# Patient Record
Sex: FEMALE | Race: WHITE | NOT HISPANIC OR LATINO | Employment: FULL TIME | URBAN - METROPOLITAN AREA
[De-identification: names, ages, dates, MRNs, and addresses within clinical notes are randomized per-mention and may not be internally consistent; named-entity substitution may affect disease eponyms.]

---

## 2023-10-24 ENCOUNTER — APPOINTMENT (OUTPATIENT)
Dept: SURGERY | Facility: CLINIC | Age: 48
End: 2023-10-24
Payer: COMMERCIAL

## 2023-11-09 DIAGNOSIS — M25.561 RIGHT KNEE PAIN, UNSPECIFIED CHRONICITY: Primary | ICD-10-CM

## 2023-11-15 ENCOUNTER — OFFICE VISIT (OUTPATIENT)
Dept: ORTHOPEDIC SURGERY | Facility: CLINIC | Age: 48
End: 2023-11-15
Payer: COMMERCIAL

## 2023-11-15 ENCOUNTER — HOSPITAL ENCOUNTER (OUTPATIENT)
Dept: RADIOLOGY | Facility: HOSPITAL | Age: 48
Discharge: HOME | End: 2023-11-15
Payer: COMMERCIAL

## 2023-11-15 VITALS — WEIGHT: 293 LBS | HEIGHT: 66 IN | BODY MASS INDEX: 47.09 KG/M2

## 2023-11-15 DIAGNOSIS — M54.31 RIGHT SCIATIC NERVE PAIN: ICD-10-CM

## 2023-11-15 DIAGNOSIS — M25.561 RIGHT KNEE PAIN, UNSPECIFIED CHRONICITY: ICD-10-CM

## 2023-11-15 PROCEDURE — 1036F TOBACCO NON-USER: CPT

## 2023-11-15 PROCEDURE — 20610 DRAIN/INJ JOINT/BURSA W/O US: CPT

## 2023-11-15 PROCEDURE — 73564 X-RAY EXAM KNEE 4 OR MORE: CPT | Mod: RIGHT SIDE | Performed by: RADIOLOGY

## 2023-11-15 PROCEDURE — 73564 X-RAY EXAM KNEE 4 OR MORE: CPT | Mod: RT,FY

## 2023-11-15 PROCEDURE — 99203 OFFICE O/P NEW LOW 30 MIN: CPT

## 2023-11-15 PROCEDURE — 3008F BODY MASS INDEX DOCD: CPT

## 2023-11-15 RX ORDER — TRIAMCINOLONE ACETONIDE 40 MG/ML
10 INJECTION, SUSPENSION INTRA-ARTICULAR; INTRAMUSCULAR
Status: COMPLETED | OUTPATIENT
Start: 2023-11-15 | End: 2023-11-15

## 2023-11-15 RX ORDER — HYDROCHLOROTHIAZIDE 25 MG/1
25 TABLET ORAL DAILY
COMMUNITY
End: 2024-05-02 | Stop reason: WASHOUT

## 2023-11-15 RX ORDER — LIDOCAINE HYDROCHLORIDE 5 MG/ML
4 INJECTION, SOLUTION INFILTRATION; PERINEURAL
Status: COMPLETED | OUTPATIENT
Start: 2023-11-15 | End: 2023-11-15

## 2023-11-15 RX ADMIN — TRIAMCINOLONE ACETONIDE 10 MG: 40 INJECTION, SUSPENSION INTRA-ARTICULAR; INTRAMUSCULAR at 13:07

## 2023-11-15 RX ADMIN — LIDOCAINE HYDROCHLORIDE 4 ML: 5 INJECTION, SOLUTION INFILTRATION; PERINEURAL at 13:07

## 2023-11-15 ASSESSMENT — PAIN - FUNCTIONAL ASSESSMENT: PAIN_FUNCTIONAL_ASSESSMENT: 0-10

## 2023-11-15 ASSESSMENT — PAIN SCALES - GENERAL: PAINLEVEL_OUTOF10: 5 - MODERATE PAIN

## 2023-11-15 ASSESSMENT — PAIN DESCRIPTION - DESCRIPTORS: DESCRIPTORS: ACHING

## 2023-11-15 NOTE — PROGRESS NOTES
HPI  Lore Olguin is a 48 y.o. female  in office today for   Chief Complaint   Patient presents with    Right Knee - Edema, Pain   .  she also has pain in the left knee, but right is considerably worse.  Pain started about 7 months ago, he went to the ED at the time, x-rays negative, was diagnosed with bursitis by another provider at the time, went to a few sessions of PT which helped.  Over the last 2 months the pain has increased again and over the past 2 weeks to the point where she is having difficulty with stairs.  She has been taking ibuprofen and tylenol, resting, icing and elevating the knee with no good relief of the pain.  She also mentions she has right side sciatic pain and right shoulder pain.      Medication  Current Outpatient Medications on File Prior to Visit   Medication Sig Dispense Refill    hydroCHLOROthiazide (HYDRODiuril) 25 mg tablet Take 1 tablet (25 mg) by mouth once daily.       No current facility-administered medications on file prior to visit.       Physical Exam  Constitutional: well developed, well nourished overweight female in no acute distress  Psychiatric: normal mood, appropriate affect  Eyes: sclera anicteric  HENT: normocephalic/atraumatic  CV: regular rate and rhythm   Respiratory: non labored breathing  Integumentary: no rash  Neurological: moves all extremities    Right Knee Exam     Tenderness   The patient is experiencing tenderness in the medial joint line and MCL.    Range of Motion   Extension:  0   Flexion:  120     Tests   Margarito:  Medial - positive Lateral - negative  Varus: negative Valgus: negative  Drawer:  Anterior - negative    Posterior - negative  Patellar apprehension: negative        Patient ID: Lore Olguin is a 48 y.o. female.    L Inj/Asp: R knee on 11/15/2023 1:07 PM  Indications: pain  Details: 22 G needle, anterolateral approach  Medications: 10 mg triamcinolone acetonide 40 mg/mL; 4 mL lidocaine 5 mg/mL (0.5 %)  Outcome: tolerated well, no immediate  complications  Procedure, treatment alternatives, risks and benefits explained, specific risks discussed. Consent was given by the patient. Immediately prior to procedure a time out was called to verify the correct patient, procedure, equipment, support staff and site/side marked as required. Patient was prepped and draped in the usual sterile fashion.             Imaging/Lab:  X-rays were taken today which were reviewed by myself and read by myself and show no acute fracture or dislocation, no significant degenerative change      Assessment  Assessment: Right knee pain    Plan  Plan:  History, physical exam, and imaging were reviewed with patient. Given persistence of pain and positive Margarito, meniscus injury can not be ruled out.  Discussed conservative options for knee pain and meniscus pain including RICE, antiinflammatories, injections, and PT.  She will continue with RICE and antiinflammatories as she has been, additionally orders for PT were given to patient as she does not recall the exercises from PT earlier in the year.  Additionally trying a cortisone injection today per the procedure note above.  Also entered referral for ortho spine for sciatic and right sided arm pain she has been having.  Follow Up: Patient to follow up as needed if pain persists or gets worse over the next 4-6 weeks.      All questions were answered for the patient prior to end of exam and patient addressed their understanding.    Lianne Zeng PA-C  11/15/23

## 2023-11-22 DIAGNOSIS — K21.9 GASTROESOPHAGEAL REFLUX DISEASE WITHOUT ESOPHAGITIS: Primary | ICD-10-CM

## 2023-11-22 DIAGNOSIS — K58.8 OTHER IRRITABLE BOWEL SYNDROME: ICD-10-CM

## 2023-12-02 RX ORDER — DICYCLOMINE HYDROCHLORIDE 20 MG/1
20 TABLET ORAL 3 TIMES DAILY
Qty: 270 TABLET | Refills: 3 | Status: SHIPPED | OUTPATIENT
Start: 2023-12-02 | End: 2024-05-16 | Stop reason: SINTOL

## 2023-12-02 RX ORDER — OMEPRAZOLE 40 MG/1
CAPSULE, DELAYED RELEASE ORAL
Qty: 90 CAPSULE | Refills: 3 | Status: SHIPPED | OUTPATIENT
Start: 2023-12-02

## 2024-05-02 ENCOUNTER — OFFICE VISIT (OUTPATIENT)
Dept: PRIMARY CARE | Facility: CLINIC | Age: 49
End: 2024-05-02
Payer: COMMERCIAL

## 2024-05-02 VITALS
HEIGHT: 66 IN | OXYGEN SATURATION: 97 % | SYSTOLIC BLOOD PRESSURE: 140 MMHG | RESPIRATION RATE: 16 BRPM | WEIGHT: 293 LBS | BODY MASS INDEX: 47.09 KG/M2 | DIASTOLIC BLOOD PRESSURE: 92 MMHG | HEART RATE: 87 BPM

## 2024-05-02 DIAGNOSIS — Z12.31 SCREENING MAMMOGRAM FOR BREAST CANCER: ICD-10-CM

## 2024-05-02 DIAGNOSIS — E66.01 MORBID OBESITY DUE TO EXCESS CALORIES (MULTI): ICD-10-CM

## 2024-05-02 DIAGNOSIS — Z13.6 SCREENING FOR CARDIOVASCULAR CONDITION: ICD-10-CM

## 2024-05-02 DIAGNOSIS — M19.071 PRIMARY OSTEOARTHRITIS OF RIGHT FOOT: ICD-10-CM

## 2024-05-02 DIAGNOSIS — K21.9 GERD WITHOUT ESOPHAGITIS: ICD-10-CM

## 2024-05-02 DIAGNOSIS — I10 PRIMARY HYPERTENSION: ICD-10-CM

## 2024-05-02 DIAGNOSIS — Z13.29 SCREENING FOR HYPOTHYROIDISM: ICD-10-CM

## 2024-05-02 DIAGNOSIS — G47.33 OBSTRUCTIVE SLEEP APNEA: ICD-10-CM

## 2024-05-02 DIAGNOSIS — R60.0 EDEMA OF BOTH LEGS: ICD-10-CM

## 2024-05-02 DIAGNOSIS — R73.9 HYPERGLYCEMIA: ICD-10-CM

## 2024-05-02 DIAGNOSIS — E11.40 TYPE 2 DIABETES MELLITUS WITH DIABETIC NEUROPATHY, WITHOUT LONG-TERM CURRENT USE OF INSULIN (MULTI): ICD-10-CM

## 2024-05-02 DIAGNOSIS — E11.9 DIABETES MELLITUS WITHOUT COMPLICATION (MULTI): Primary | ICD-10-CM

## 2024-05-02 PROCEDURE — 3008F BODY MASS INDEX DOCD: CPT | Performed by: INTERNAL MEDICINE

## 2024-05-02 PROCEDURE — 4010F ACE/ARB THERAPY RXD/TAKEN: CPT | Performed by: INTERNAL MEDICINE

## 2024-05-02 PROCEDURE — 3077F SYST BP >= 140 MM HG: CPT | Performed by: INTERNAL MEDICINE

## 2024-05-02 PROCEDURE — 1036F TOBACCO NON-USER: CPT | Performed by: INTERNAL MEDICINE

## 2024-05-02 PROCEDURE — 3080F DIAST BP >= 90 MM HG: CPT | Performed by: INTERNAL MEDICINE

## 2024-05-02 PROCEDURE — 99214 OFFICE O/P EST MOD 30 MIN: CPT | Performed by: INTERNAL MEDICINE

## 2024-05-02 RX ORDER — TIRZEPATIDE 2.5 MG/.5ML
2.5 INJECTION, SOLUTION SUBCUTANEOUS
Qty: 6 ML | Refills: 3 | Status: SHIPPED | OUTPATIENT
Start: 2024-05-05 | End: 2025-05-05

## 2024-05-02 RX ORDER — LORATADINE 10 MG/1
10 TABLET ORAL DAILY PRN
COMMUNITY

## 2024-05-02 RX ORDER — LEVONORGESTREL 52 MG/1
1 INTRAUTERINE DEVICE INTRAUTERINE ONCE
COMMUNITY

## 2024-05-02 RX ORDER — LOSARTAN POTASSIUM 25 MG/1
25 TABLET ORAL DAILY
Qty: 100 TABLET | Refills: 3 | Status: SHIPPED | OUTPATIENT
Start: 2024-05-02 | End: 2025-06-06

## 2024-05-02 RX ORDER — BUPROPION HYDROCHLORIDE 150 MG/1
150 TABLET ORAL EVERY MORNING
Qty: 90 TABLET | Refills: 3 | Status: SHIPPED | OUTPATIENT
Start: 2024-05-02 | End: 2025-05-02

## 2024-05-02 ASSESSMENT — ENCOUNTER SYMPTOMS
OCCASIONAL FEELINGS OF UNSTEADINESS: 0
SHORTNESS OF BREATH: 1
ABDOMINAL PAIN: 1
ARTHRALGIAS: 1
LOSS OF SENSATION IN FEET: 0
NAUSEA: 0
DEPRESSION: 0
PALPITATIONS: 0
COUGH: 0
DIZZINESS: 0
DIARRHEA: 0
CONSTIPATION: 0

## 2024-05-02 ASSESSMENT — PATIENT HEALTH QUESTIONNAIRE - PHQ9
1. LITTLE INTEREST OR PLEASURE IN DOING THINGS: NOT AT ALL
SUM OF ALL RESPONSES TO PHQ9 QUESTIONS 1 AND 2: 0
2. FEELING DOWN, DEPRESSED OR HOPELESS: NOT AT ALL

## 2024-05-02 ASSESSMENT — PAIN SCALES - GENERAL: PAINLEVEL: 5

## 2024-05-02 NOTE — PROGRESS NOTES
"Subjective   Patient ID: Lore Olguin is a 48 y.o. female who presents for GI issues. Patient reports abdom pressure, despite taking meds. Edema is worse, c/o R foot pain, the foot doctor prescribed meloxicam which made her retain fluid and  gain weight. C/o exertional SOB, epigastric pain, bloating    Diagnostics Reviewed:  Labs Reviewed:         Review of Systems   Respiratory:  Positive for shortness of breath. Negative for cough.    Cardiovascular:  Negative for chest pain and palpitations.   Gastrointestinal:  Positive for abdominal pain. Negative for constipation, diarrhea and nausea.   Musculoskeletal:  Positive for arthralgias.   Neurological:  Negative for dizziness.       Objective   BP (!) 140/92   Pulse 87   Resp 16   Ht 1.676 m (5' 6\")   Wt (!) 157 kg (347 lb)   LMP  (LMP Unknown) Comment: absent with mirena  SpO2 97%   BMI 56.01 kg/m²     Physical Exam  Constitutional:       Appearance: Normal appearance.   Cardiovascular:      Rate and Rhythm: Normal rate and regular rhythm.      Heart sounds: Normal heart sounds.   Pulmonary:      Breath sounds: Normal breath sounds.   Abdominal:      Palpations: Abdomen is soft. There is no hepatomegaly.      Tenderness: There is abdominal tenderness (epigastric).   Musculoskeletal:      Right lower leg: Edema present.      Left lower leg: Edema present.   Neurological:      Mental Status: She is alert and oriented to person, place, and time.      Gait: Gait normal.   Psychiatric:         Mood and Affect: Mood normal.         Behavior: Behavior normal.         Assessment/Plan   Diagnoses and all orders for this visit:  Diabetes mellitus without complication (Multi)  -     CBC and Auto Differential; Future  -     Comprehensive Metabolic Panel; Future  -     ELLIOTT without Reflex ADRIANA; Future  -     Hemoglobin A1C; Future  -     Sedimentation Rate; Future  -     C-Reactive Protein; Future  -     Lipid Panel; Future  -     TSH with reflex to Free T4 if abnormal; " Future  -     Albumin , Urine Random; Future  -     Uric Acid; Future  -     Citrulline Antibody, IgG; Future  -     H. Pylori Antigen, Stool; Future  -     Referral to Gastroenterology; Future  -     Cortisol, Urine, Free; Future  -     Creatinine, urine, 24 hour; Future  Primary hypertension  -     CBC and Auto Differential; Future  -     Comprehensive Metabolic Panel; Future  -     ELLIOTT without Reflex ADRIANA; Future  -     Hemoglobin A1C; Future  -     Sedimentation Rate; Future  -     C-Reactive Protein; Future  -     Lipid Panel; Future  -     TSH with reflex to Free T4 if abnormal; Future  -     Albumin , Urine Random; Future  -     Uric Acid; Future  -     Citrulline Antibody, IgG; Future  -     H. Pylori Antigen, Stool; Future  -     Referral to Gastroenterology; Future  -     Cortisol, Urine, Free; Future  -     Creatinine, urine, 24 hour; Future  -     losartan (Cozaar) 25 mg tablet; Take 1 tablet (25 mg) by mouth once daily.  Hyperglycemia  -     CBC and Auto Differential; Future  -     Comprehensive Metabolic Panel; Future  -     ELLIOTT without Reflex ADRIANA; Future  -     Hemoglobin A1C; Future  -     Sedimentation Rate; Future  -     C-Reactive Protein; Future  -     Lipid Panel; Future  -     TSH with reflex to Free T4 if abnormal; Future  -     Albumin , Urine Random; Future  -     Uric Acid; Future  -     Citrulline Antibody, IgG; Future  -     H. Pylori Antigen, Stool; Future  -     Referral to Gastroenterology; Future  -     Cortisol, Urine, Free; Future  -     Creatinine, urine, 24 hour; Future  GERD without esophagitis  -     CBC and Auto Differential; Future  -     Comprehensive Metabolic Panel; Future  -     ELLIOTT without Reflex ADRIANA; Future  -     Hemoglobin A1C; Future  -     Sedimentation Rate; Future  -     C-Reactive Protein; Future  -     Lipid Panel; Future  -     TSH with reflex to Free T4 if abnormal; Future  -     Albumin , Urine Random; Future  -     Uric Acid; Future  -     Citrulline Antibody,  IgG; Future  -     H. Pylori Antigen, Stool; Future  -     Referral to Gastroenterology; Future  -     Cortisol, Urine, Free; Future  -     Creatinine, urine, 24 hour; Future  -     Referral to Gastroenterology; Future  Screening mammogram for breast cancer  -     CBC and Auto Differential; Future  -     Comprehensive Metabolic Panel; Future  -     ELLIOTT without Reflex ADRIANA; Future  -     Hemoglobin A1C; Future  -     Sedimentation Rate; Future  -     C-Reactive Protein; Future  -     Lipid Panel; Future  -     TSH with reflex to Free T4 if abnormal; Future  -     Albumin , Urine Random; Future  -     Uric Acid; Future  -     Citrulline Antibody, IgG; Future  -     H. Pylori Antigen, Stool; Future  -     Referral to Gastroenterology; Future  -     Cortisol, Urine, Free; Future  -     Creatinine, urine, 24 hour; Future  -     BI mammo bilateral screening tomosynthesis  -     Referral to Obstetrics / Gynecology; Future  Screening for hypothyroidism  -     CBC and Auto Differential; Future  -     Comprehensive Metabolic Panel; Future  -     ELLIOTT without Reflex ADRIANA; Future  -     Hemoglobin A1C; Future  -     Sedimentation Rate; Future  -     C-Reactive Protein; Future  -     Lipid Panel; Future  -     TSH with reflex to Free T4 if abnormal; Future  -     Albumin , Urine Random; Future  -     Uric Acid; Future  -     Citrulline Antibody, IgG; Future  -     H. Pylori Antigen, Stool; Future  -     Referral to Gastroenterology; Future  -     Cortisol, Urine, Free; Future  -     Creatinine, urine, 24 hour; Future  Screening for cardiovascular condition  -     CBC and Auto Differential; Future  -     Comprehensive Metabolic Panel; Future  -     ELLIOTT without Reflex ADRIANA; Future  -     Hemoglobin A1C; Future  -     Sedimentation Rate; Future  -     C-Reactive Protein; Future  -     Lipid Panel; Future  -     TSH with reflex to Free T4 if abnormal; Future  -     Albumin , Urine Random; Future  -     Uric Acid; Future  -      Citrulline Antibody, IgG; Future  -     H. Pylori Antigen, Stool; Future  -     Referral to Gastroenterology; Future  -     Cortisol, Urine, Free; Future  -     Creatinine, urine, 24 hour; Future  Primary osteoarthritis of right foot  -     CBC and Auto Differential; Future  -     Comprehensive Metabolic Panel; Future  -     ELLIOTT without Reflex ADRIANA; Future  -     Hemoglobin A1C; Future  -     Sedimentation Rate; Future  -     C-Reactive Protein; Future  -     Lipid Panel; Future  -     TSH with reflex to Free T4 if abnormal; Future  -     Albumin , Urine Random; Future  -     Uric Acid; Future  -     Citrulline Antibody, IgG; Future  -     H. Pylori Antigen, Stool; Future  -     Referral to Gastroenterology; Future  -     Cortisol, Urine, Free; Future  -     Creatinine, urine, 24 hour; Future  Type 2 diabetes mellitus with diabetic neuropathy, without long-term current use of insulin (Multi)  -     CBC and Auto Differential; Future  -     Comprehensive Metabolic Panel; Future  -     ELLIOTT without Reflex ADRIANA; Future  -     Hemoglobin A1C; Future  -     Sedimentation Rate; Future  -     C-Reactive Protein; Future  -     Lipid Panel; Future  -     TSH with reflex to Free T4 if abnormal; Future  -     Albumin , Urine Random; Future  -     Uric Acid; Future  -     Citrulline Antibody, IgG; Future  -     H. Pylori Antigen, Stool; Future  -     Referral to Gastroenterology; Future  -     Cortisol, Urine, Free; Future  -     Creatinine, urine, 24 hour; Future  -     tirzepatide (Mounjaro) 2.5 mg/0.5 mL pen injector; Inject 2.5 mg under the skin 1 (one) time per week.  -     empagliflozin (Jardiance) 10 mg; Take 1 tablet (10 mg) by mouth once daily.  Obstructive sleep apnea  -     Home sleep apnea test (HSAT); Future  Edema of both legs  -     Transthoracic Echo (TTE) Complete; Future  Morbid obesity due to excess calories (Multi)  -     buPROPion XL (Wellbutrin XL) 150 mg 24 hr tablet; Take 1 tablet (150 mg) by mouth once  daily in the morning. Do not crush, chew, or split.  Other orders  -     perflutren lipid microspheres (Definity) injection 0.5-10 mL of dilution  -     sulfur hexafluoride microsphr (Lumason) injection 24.28 mg  -     perflutren protein A microsphere (Optison) injection 0.5 mL

## 2024-05-06 ENCOUNTER — LAB (OUTPATIENT)
Dept: LAB | Facility: LAB | Age: 49
End: 2024-05-06
Payer: COMMERCIAL

## 2024-05-06 DIAGNOSIS — Z13.29 SCREENING FOR HYPOTHYROIDISM: ICD-10-CM

## 2024-05-06 DIAGNOSIS — Z13.6 SCREENING FOR CARDIOVASCULAR CONDITION: ICD-10-CM

## 2024-05-06 DIAGNOSIS — M19.071 PRIMARY OSTEOARTHRITIS OF RIGHT FOOT: ICD-10-CM

## 2024-05-06 DIAGNOSIS — Z12.31 SCREENING MAMMOGRAM FOR BREAST CANCER: ICD-10-CM

## 2024-05-06 DIAGNOSIS — R73.9 HYPERGLYCEMIA: ICD-10-CM

## 2024-05-06 DIAGNOSIS — R76.8 POSITIVE ANA (ANTINUCLEAR ANTIBODY): Primary | ICD-10-CM

## 2024-05-06 DIAGNOSIS — E11.9 DIABETES MELLITUS WITHOUT COMPLICATION (MULTI): ICD-10-CM

## 2024-05-06 DIAGNOSIS — K21.9 GERD WITHOUT ESOPHAGITIS: ICD-10-CM

## 2024-05-06 DIAGNOSIS — E11.40 TYPE 2 DIABETES MELLITUS WITH DIABETIC NEUROPATHY, WITHOUT LONG-TERM CURRENT USE OF INSULIN (MULTI): ICD-10-CM

## 2024-05-06 DIAGNOSIS — I10 PRIMARY HYPERTENSION: ICD-10-CM

## 2024-05-06 LAB
ALBUMIN SERPL-MCNC: 4.2 G/DL (ref 3.5–5)
ALP BLD-CCNC: 110 U/L (ref 35–125)
ALT SERPL-CCNC: 17 U/L (ref 5–40)
ANION GAP SERPL CALC-SCNC: 10 MMOL/L
AST SERPL-CCNC: 11 U/L (ref 5–40)
BASOPHILS # BLD AUTO: 0.08 X10*3/UL (ref 0–0.1)
BASOPHILS NFR BLD AUTO: 0.8 %
BILIRUB SERPL-MCNC: 0.3 MG/DL (ref 0.1–1.2)
BUN SERPL-MCNC: 13 MG/DL (ref 8–25)
CALCIUM SERPL-MCNC: 9.6 MG/DL (ref 8.5–10.4)
CCP IGG SERPL-ACNC: <1 U/ML
CHLORIDE SERPL-SCNC: 102 MMOL/L (ref 97–107)
CHOLEST SERPL-MCNC: 201 MG/DL (ref 133–200)
CHOLEST/HDLC SERPL: 4.5 {RATIO}
CO2 SERPL-SCNC: 27 MMOL/L (ref 24–31)
CREAT SERPL-MCNC: 0.9 MG/DL (ref 0.4–1.6)
CRP SERPL-MCNC: 1.4 MG/DL (ref 0–2)
EGFRCR SERPLBLD CKD-EPI 2021: 79 ML/MIN/1.73M*2
EOSINOPHIL # BLD AUTO: 0.13 X10*3/UL (ref 0–0.7)
EOSINOPHIL NFR BLD AUTO: 1.2 %
ERYTHROCYTE [DISTWIDTH] IN BLOOD BY AUTOMATED COUNT: 14.5 % (ref 11.5–14.5)
ERYTHROCYTE [SEDIMENTATION RATE] IN BLOOD BY WESTERGREN METHOD: 48 MM/H (ref 0–20)
EST. AVERAGE GLUCOSE BLD GHB EST-MCNC: 148 MG/DL
GLUCOSE SERPL-MCNC: 158 MG/DL (ref 65–99)
HBA1C MFR BLD: 6.8 %
HCT VFR BLD AUTO: 41.1 % (ref 36–46)
HDLC SERPL-MCNC: 45 MG/DL
HGB BLD-MCNC: 12.8 G/DL (ref 12–16)
IMM GRANULOCYTES # BLD AUTO: 0.04 X10*3/UL (ref 0–0.7)
IMM GRANULOCYTES NFR BLD AUTO: 0.4 % (ref 0–0.9)
LDLC SERPL CALC-MCNC: 131 MG/DL (ref 65–130)
LYMPHOCYTES # BLD AUTO: 3.43 X10*3/UL (ref 1.2–4.8)
LYMPHOCYTES NFR BLD AUTO: 32.3 %
MCH RBC QN AUTO: 27.4 PG (ref 26–34)
MCHC RBC AUTO-ENTMCNC: 31.1 G/DL (ref 32–36)
MCV RBC AUTO: 88 FL (ref 80–100)
MONOCYTES # BLD AUTO: 0.42 X10*3/UL (ref 0.1–1)
MONOCYTES NFR BLD AUTO: 4 %
NEUTROPHILS # BLD AUTO: 6.51 X10*3/UL (ref 1.2–7.7)
NEUTROPHILS NFR BLD AUTO: 61.3 %
NRBC BLD-RTO: 0 /100 WBCS (ref 0–0)
PLATELET # BLD AUTO: 431 X10*3/UL (ref 150–450)
POTASSIUM SERPL-SCNC: 4.3 MMOL/L (ref 3.4–5.1)
PROT SERPL-MCNC: 7.5 G/DL (ref 5.9–7.9)
RBC # BLD AUTO: 4.68 X10*6/UL (ref 4–5.2)
SODIUM SERPL-SCNC: 139 MMOL/L (ref 133–145)
TRIGL SERPL-MCNC: 125 MG/DL (ref 40–150)
TSH SERPL DL<=0.05 MIU/L-ACNC: 2.57 MIU/L (ref 0.27–4.2)
URATE SERPL-MCNC: 6.8 MG/DL (ref 2.5–6.8)
WBC # BLD AUTO: 10.6 X10*3/UL (ref 4.4–11.3)

## 2024-05-06 PROCEDURE — 86235 NUCLEAR ANTIGEN ANTIBODY: CPT

## 2024-05-06 PROCEDURE — 84443 ASSAY THYROID STIM HORMONE: CPT

## 2024-05-06 PROCEDURE — 85652 RBC SED RATE AUTOMATED: CPT

## 2024-05-06 PROCEDURE — 80061 LIPID PANEL: CPT

## 2024-05-06 PROCEDURE — 84550 ASSAY OF BLOOD/URIC ACID: CPT

## 2024-05-06 PROCEDURE — 86140 C-REACTIVE PROTEIN: CPT

## 2024-05-06 PROCEDURE — 83036 HEMOGLOBIN GLYCOSYLATED A1C: CPT

## 2024-05-06 PROCEDURE — 87449 NOS EACH ORGANISM AG IA: CPT

## 2024-05-06 PROCEDURE — 85025 COMPLETE CBC W/AUTO DIFF WBC: CPT

## 2024-05-06 PROCEDURE — 36415 COLL VENOUS BLD VENIPUNCTURE: CPT

## 2024-05-06 PROCEDURE — 86225 DNA ANTIBODY NATIVE: CPT

## 2024-05-06 PROCEDURE — 86147 CARDIOLIPIN ANTIBODY EA IG: CPT

## 2024-05-06 PROCEDURE — 86038 ANTINUCLEAR ANTIBODIES: CPT

## 2024-05-06 PROCEDURE — 86200 CCP ANTIBODY: CPT

## 2024-05-06 PROCEDURE — 80053 COMPREHEN METABOLIC PANEL: CPT

## 2024-05-07 ENCOUNTER — LAB (OUTPATIENT)
Dept: LAB | Facility: LAB | Age: 49
End: 2024-05-07
Payer: COMMERCIAL

## 2024-05-07 DIAGNOSIS — E11.9 DIABETES MELLITUS WITHOUT COMPLICATION (MULTI): ICD-10-CM

## 2024-05-07 DIAGNOSIS — Z13.29 SCREENING FOR HYPOTHYROIDISM: ICD-10-CM

## 2024-05-07 DIAGNOSIS — M19.071 PRIMARY OSTEOARTHRITIS OF RIGHT FOOT: ICD-10-CM

## 2024-05-07 DIAGNOSIS — E11.40 TYPE 2 DIABETES MELLITUS WITH DIABETIC NEUROPATHY, WITHOUT LONG-TERM CURRENT USE OF INSULIN (MULTI): ICD-10-CM

## 2024-05-07 DIAGNOSIS — K21.9 GERD WITHOUT ESOPHAGITIS: ICD-10-CM

## 2024-05-07 DIAGNOSIS — Z12.31 SCREENING MAMMOGRAM FOR BREAST CANCER: ICD-10-CM

## 2024-05-07 DIAGNOSIS — I10 PRIMARY HYPERTENSION: ICD-10-CM

## 2024-05-07 DIAGNOSIS — R73.9 HYPERGLYCEMIA: ICD-10-CM

## 2024-05-07 DIAGNOSIS — Z13.6 SCREENING FOR CARDIOVASCULAR CONDITION: ICD-10-CM

## 2024-05-07 LAB
COLLECT DURATION TIME SPEC: 24 HRS
CREAT 24H UR-MCNC: 72 MG/DL
CREAT 24H UR-MRATE: 1.58 G/24 H
CREAT UR-MCNC: 72.1 MG/DL
MICROALBUMIN UR-MCNC: <12 MG/L (ref 0–23)
MICROALBUMIN/CREAT UR: NORMAL MG/G{CREAT}
SPECIMEN VOL 24H UR: 2200 ML

## 2024-05-07 PROCEDURE — 82043 UR ALBUMIN QUANTITATIVE: CPT

## 2024-05-07 PROCEDURE — 82530 CORTISOL FREE: CPT

## 2024-05-07 PROCEDURE — 82570 ASSAY OF URINE CREATININE: CPT

## 2024-05-07 PROCEDURE — 81050 URINALYSIS VOLUME MEASURE: CPT

## 2024-05-07 NOTE — RESULT ENCOUNTER NOTE
Labs okay except.  She has diabetes, continue meds as prescribed.  Sedimentation rate slightly high, which is nonspecific, awaiting results of stool for H. pylori and 24-hour urine collection for cortisol level

## 2024-05-09 LAB
ANA PATTERN: ABNORMAL
ANA SER QL HEP2 SUBST: POSITIVE
ANA TITR SER IF: ABNORMAL {TITER}
H PYLORI AG STL QL IA: NEGATIVE

## 2024-05-10 LAB
CARDIOLIPIN IGA SERPL-ACNC: 0.7 APL U/ML
CARDIOLIPIN IGG SER IA-ACNC: 1.9 GPL U/ML
CARDIOLIPIN IGM SER IA-ACNC: 7.5 MPL U/ML
DSDNA AB SER-ACNC: <1 IU/ML
ENA SCL70 AB SER QL IA: <0.2 AI
ENA SM AB SER IA-ACNC: <0.2 AI
ENA SS-A AB SER IA-ACNC: <0.2 AI
ENA SS-B AB SER IA-ACNC: <0.2 AI

## 2024-05-11 ENCOUNTER — CLINICAL SUPPORT (OUTPATIENT)
Dept: SLEEP MEDICINE | Facility: HOSPITAL | Age: 49
End: 2024-05-11
Payer: COMMERCIAL

## 2024-05-11 DIAGNOSIS — G47.33 OBSTRUCTIVE SLEEP APNEA: ICD-10-CM

## 2024-05-11 LAB
ANNOTATION COMMENT IMP: ABNORMAL
COLLECT DURATION TIME SPEC: 24 HR
CORTIS F 24H UR HPLC-MCNC: 5.94 UG/L
CORTIS F 24H UR-MRATE: 13.1 UG/D
CORTIS F/CREAT 24H UR: 8.03 UG/G CRT
CREAT 24H UR-MRATE: 1628 MG/D (ref 700–1600)
CREAT UR-MCNC: 74 MG/DL
SPECIMEN VOL ?TM UR: 2200 ML

## 2024-05-11 PROCEDURE — 95806 SLEEP STUDY UNATT&RESP EFFT: CPT | Performed by: PSYCHIATRY & NEUROLOGY

## 2024-05-13 ENCOUNTER — HOSPITAL ENCOUNTER (OUTPATIENT)
Dept: RADIOLOGY | Facility: CLINIC | Age: 49
Discharge: HOME | End: 2024-05-13
Payer: COMMERCIAL

## 2024-05-13 VITALS — BODY MASS INDEX: 45.99 KG/M2 | HEIGHT: 67 IN | WEIGHT: 293 LBS

## 2024-05-13 PROCEDURE — 77063 BREAST TOMOSYNTHESIS BI: CPT | Performed by: RADIOLOGY

## 2024-05-13 PROCEDURE — 77067 SCR MAMMO BI INCL CAD: CPT

## 2024-05-13 PROCEDURE — 77067 SCR MAMMO BI INCL CAD: CPT | Performed by: RADIOLOGY

## 2024-05-16 ENCOUNTER — OFFICE VISIT (OUTPATIENT)
Dept: PRIMARY CARE | Facility: CLINIC | Age: 49
End: 2024-05-16
Payer: COMMERCIAL

## 2024-05-16 VITALS
SYSTOLIC BLOOD PRESSURE: 130 MMHG | BODY MASS INDEX: 45.99 KG/M2 | HEART RATE: 80 BPM | HEIGHT: 67 IN | OXYGEN SATURATION: 99 % | RESPIRATION RATE: 16 BRPM | WEIGHT: 293 LBS | DIASTOLIC BLOOD PRESSURE: 82 MMHG

## 2024-05-16 DIAGNOSIS — I10 PRIMARY HYPERTENSION: ICD-10-CM

## 2024-05-16 DIAGNOSIS — R11.0 NAUSEA: ICD-10-CM

## 2024-05-16 DIAGNOSIS — K21.9 GERD WITHOUT ESOPHAGITIS: Primary | ICD-10-CM

## 2024-05-16 DIAGNOSIS — E11.9 DIABETES MELLITUS WITHOUT COMPLICATION (MULTI): ICD-10-CM

## 2024-05-16 PROCEDURE — 4010F ACE/ARB THERAPY RXD/TAKEN: CPT | Performed by: INTERNAL MEDICINE

## 2024-05-16 PROCEDURE — 3061F NEG MICROALBUMINURIA REV: CPT | Performed by: INTERNAL MEDICINE

## 2024-05-16 PROCEDURE — 3050F LDL-C >= 130 MG/DL: CPT | Performed by: INTERNAL MEDICINE

## 2024-05-16 PROCEDURE — 3044F HG A1C LEVEL LT 7.0%: CPT | Performed by: INTERNAL MEDICINE

## 2024-05-16 PROCEDURE — 99213 OFFICE O/P EST LOW 20 MIN: CPT | Performed by: INTERNAL MEDICINE

## 2024-05-16 PROCEDURE — 3079F DIAST BP 80-89 MM HG: CPT | Performed by: INTERNAL MEDICINE

## 2024-05-16 PROCEDURE — 3008F BODY MASS INDEX DOCD: CPT | Performed by: INTERNAL MEDICINE

## 2024-05-16 PROCEDURE — 3075F SYST BP GE 130 - 139MM HG: CPT | Performed by: INTERNAL MEDICINE

## 2024-05-16 RX ORDER — ONDANSETRON 4 MG/1
8 TABLET, FILM COATED ORAL EVERY 8 HOURS PRN
Qty: 90 TABLET | Refills: 1 | Status: SHIPPED | OUTPATIENT
Start: 2024-05-16 | End: 2024-06-10 | Stop reason: ALTCHOICE

## 2024-05-16 ASSESSMENT — PATIENT HEALTH QUESTIONNAIRE - PHQ9
SUM OF ALL RESPONSES TO PHQ9 QUESTIONS 1 AND 2: 0
2. FEELING DOWN, DEPRESSED OR HOPELESS: NOT AT ALL
1. LITTLE INTEREST OR PLEASURE IN DOING THINGS: NOT AT ALL

## 2024-05-16 ASSESSMENT — ENCOUNTER SYMPTOMS
ROS GI COMMENTS: ERUCTATION
NAUSEA: 1
CONSTIPATION: 0
DEPRESSION: 0
ABDOMINAL PAIN: 1
LOSS OF SENSATION IN FEET: 0
DIARRHEA: 1
COUGH: 0
PALPITATIONS: 0
ABDOMINAL DISTENTION: 1
OCCASIONAL FEELINGS OF UNSTEADINESS: 0
DIZZINESS: 0
ARTHRALGIAS: 1
SHORTNESS OF BREATH: 1

## 2024-05-16 ASSESSMENT — PAIN SCALES - GENERAL: PAINLEVEL: 0-NO PAIN

## 2024-05-16 NOTE — PROGRESS NOTES
"Subjective   Patient ID: Lore Olguin is a 48 y.o. female who presents for 2 week check up. Patient wakes up feeling nauseous daily. She feels bloated and has increased eructation. Her bowel movements are not consistent and reports diarrhea. C/o epigastric abdominal pain and occ feels short of breath, especially when bending over. Recently changed her diet and started tracking what she's eating. She eats salads with chicken, less bread, and cut out soda. Her exercise is limited by knee pain. She takes all of her medications in the morning. She tried Bentyl but it did not help and no longer takes it. Has not started Mounjaro yet. Denies heartburn since starting omeprazole.     Diagnostics Reviewed: 5/2024 mammogram nml. 11/2023 XR R knee showed arthritis.  Labs Reviewed: 5/2024 labs WNL, ELLIOTT positive, A1c 6.8         Review of Systems   Respiratory:  Positive for shortness of breath. Negative for cough.    Cardiovascular:  Negative for chest pain and palpitations.   Gastrointestinal:  Positive for abdominal distention, abdominal pain, diarrhea and nausea. Negative for constipation.        Eructation   Musculoskeletal:  Positive for arthralgias.   Neurological:  Negative for dizziness.       Objective   /82   Pulse 80   Resp 16   Ht 1.702 m (5' 7\")   Wt (!) 154 kg (340 lb)   LMP  (LMP Unknown) Comment: absent with mirena  SpO2 99%   BMI 53.25 kg/m²     Physical Exam  Cardiovascular:      Rate and Rhythm: Normal rate and regular rhythm.      Heart sounds: Normal heart sounds.   Pulmonary:      Breath sounds: Normal breath sounds.   Abdominal:      Palpations: Abdomen is soft. There is no hepatomegaly.      Tenderness: There is abdominal tenderness.   Genitourinary:     General: Normal vulva.   Musculoskeletal:      Right lower leg: No edema.      Left lower leg: No edema.   Neurological:      Mental Status: She is alert and oriented to person, place, and time.      Gait: Gait normal.   Psychiatric:         " Mood and Affect: Mood normal.         Behavior: Behavior normal.         Assessment/Plan   Diagnoses and all orders for this visit:  GERD without esophagitis  -     FL upper GI w KUB; Future  Nausea  -     ondansetron (Zofran) 4 mg tablet; Take 2 tablets (8 mg) by mouth every 8 hours if needed for nausea or vomiting.  Diabetes mellitus without complication (Multi)  Primary hypertension      Scribe Attestation  By signing my name below, I, Isa Peninngton, Scribe   attest that this documentation has been prepared under the direction and in the presence of Fiordaliza Mcdonald MD.

## 2024-05-21 ENCOUNTER — APPOINTMENT (OUTPATIENT)
Dept: CARDIOLOGY | Facility: HOSPITAL | Age: 49
End: 2024-05-21
Payer: COMMERCIAL

## 2024-06-10 ENCOUNTER — PRE-ADMISSION TESTING (OUTPATIENT)
Dept: PREADMISSION TESTING | Facility: HOSPITAL | Age: 49
End: 2024-06-10
Payer: COMMERCIAL

## 2024-06-10 VITALS
HEART RATE: 79 BPM | TEMPERATURE: 97.9 F | DIASTOLIC BLOOD PRESSURE: 88 MMHG | OXYGEN SATURATION: 99 % | RESPIRATION RATE: 16 BRPM | BODY MASS INDEX: 45.99 KG/M2 | SYSTOLIC BLOOD PRESSURE: 133 MMHG | HEIGHT: 67 IN | WEIGHT: 293 LBS

## 2024-06-10 DIAGNOSIS — Z01.818 PRE-OP TESTING: Primary | ICD-10-CM

## 2024-06-10 DIAGNOSIS — Z12.31 SCREENING MAMMOGRAM FOR BREAST CANCER: ICD-10-CM

## 2024-06-10 LAB
ATRIAL RATE: 79 BPM
P AXIS: 60 DEGREES
P OFFSET: 190 MS
P ONSET: 127 MS
PR INTERVAL: 184 MS
Q ONSET: 219 MS
QRS COUNT: 13 BEATS
QRS DURATION: 106 MS
QT INTERVAL: 390 MS
QTC CALCULATION(BAZETT): 447 MS
QTC FREDERICIA: 427 MS
R AXIS: 69 DEGREES
T AXIS: 57 DEGREES
T OFFSET: 414 MS
VENTRICULAR RATE: 79 BPM

## 2024-06-10 PROCEDURE — 93010 ELECTROCARDIOGRAM REPORT: CPT | Performed by: INTERNAL MEDICINE

## 2024-06-10 PROCEDURE — 93005 ELECTROCARDIOGRAM TRACING: CPT

## 2024-06-10 ASSESSMENT — DUKE ACTIVITY SCORE INDEX (DASI)
CAN YOU WALK A BLOCK OR TWO ON LEVEL GROUND: YES
CAN YOU DO YARD WORK LIKE RAKING LEAVES, WEEDING OR PUSHING A MOWER: YES
CAN YOU CLIMB A FLIGHT OF STAIRS OR WALK UP A HILL: YES
CAN YOU DO HEAVY WORK AROUND THE HOUSE LIKE SCRUBBING FLOORS OR LIFTING AND MOVING HEAVY FURNITURE: YES
DASI METS SCORE: 8.2
CAN YOU TAKE CARE OF YOURSELF (EAT, DRESS, BATHE, OR USE TOILET): YES
CAN YOU HAVE SEXUAL RELATIONS: YES
CAN YOU PARTICIPATE IN STRENOUS SPORTS LIKE SWIMMING, SINGLES TENNIS, FOOTBALL, BASKETBALL, OR SKIING: NO
CAN YOU RUN A SHORT DISTANCE: YES
CAN YOU WALK INDOORS, SUCH AS AROUND YOUR HOUSE: YES
CAN YOU DO LIGHT WORK AROUND THE HOUSE LIKE DUSTING OR WASHING DISHES: YES
TOTAL_SCORE: 44.7
CAN YOU DO MODERATE WORK AROUND THE HOUSE LIKE VACUUMING, SWEEPING FLOORS OR CARRYING GROCERIES: YES
CAN YOU PARTICIPATE IN MODERATE RECREATIONAL ACTIVITIES LIKE GOLF, BOWLING, DANCING, DOUBLES TENNIS OR THROWING A BASEBALL OR FOOTBALL: NO

## 2024-06-10 ASSESSMENT — ENCOUNTER SYMPTOMS
DIZZINESS: 1
ROS GI COMMENTS: SEE HPI
ARTHRALGIAS: 1
FATIGUE: 1

## 2024-06-10 NOTE — CPM/PAT H&P
"CPM/PAT Evaluation       Name: Lore Olguin (Lore Olguin)  /Age: 1975/48 y.o.     In-Person       Chief Complaint: \"bloating\"    HPI  The patient is a 48 year old female.  For the past several months she has experienced intermittent bloating that occurs with and without food.  She also notes abdominal discomfort, occasional nausea, belching and intermittent diarrhea and constipation.  She denies abdominal cramping or melena.  She was seen by Dr. Cortes and an EGD and colonoscopy is recommended.    Past Medical History:   Diagnosis Date    Allergic     Edema     GERD (gastroesophageal reflux disease)     Hypertension     Nonscarring hair loss, unspecified 2015    Hair loss    Obesity     Other injury of unspecified body region, initial encounter 2015    Puncture wound    Personal history of other diseases of the circulatory system 2015    History of lymphadenitis    Personal history of other diseases of the respiratory system 2015    History of acute sinusitis    Sleep apnea     Type 2 diabetes mellitus (Multi)        Past Surgical History:   Procedure Laterality Date    ADENOIDECTOMY  2018    Adenoidectomy     SECTION, CLASSIC       Section     SECTION, CLASSIC      CHOLECYSTECTOMY      Cholecystectomy    DILATION AND CURETTAGE OF UTERUS      INTRAUTERINE DEVICE INSERTION      OTHER SURGICAL HISTORY  2019    Removal IUD    TONSILLECTOMY  2018    Tonsillectomy    UPPER GASTROINTESTINAL ENDOSCOPY       Family History   Problem Relation Name Age of Onset    No Known Problems Mother      No Known Problems Father      Breast cancer Father's Sister  65     Social History     Tobacco Use    Smoking status: Former     Current packs/day: 0.00     Average packs/day: 0.1 packs/day for 10.0 years (1.0 ttl pk-yrs)     Types: Cigarettes     Start date:      Quit date:      Years since quittin.4     Passive exposure: Never    Smokeless " "tobacco: Never   Substance Use Topics    Alcohol use: Never     Social History     Substance and Sexual Activity   Drug Use Never     Allergies   Allergen Reactions    Morphine Nausea/vomiting    Mobic [Meloxicam] Diarrhea, Dizziness and GI Upset     Current Outpatient Medications   Medication Sig Dispense Refill    buPROPion XL (Wellbutrin XL) 150 mg 24 hr tablet Take 1 tablet (150 mg) by mouth once daily in the morning. Do not crush, chew, or split. 90 tablet 3    empagliflozin (Jardiance) 10 mg Take 1 tablet (10 mg) by mouth once daily. 90 tablet 3    levonorgestrel (Mirena) 21 mcg/24 hours (8 yrs) 52 mg IUD 52 mg by intrauterine route 1 time.      loratadine (Claritin) 10 mg tablet Take 1 tablet (10 mg) by mouth once daily as needed for allergies.      losartan (Cozaar) 25 mg tablet Take 1 tablet (25 mg) by mouth once daily. 100 tablet 3    omeprazole (PriLOSEC) 40 mg DR capsule TAKE 1 CAPSULE BY MOUTH 30 MINUTES BEFORE MORNING MEAL EVERY DAY FOR 30 DAYS 90 capsule 3    tirzepatide (Mounjaro) 2.5 mg/0.5 mL pen injector Inject 2.5 mg under the skin 1 (one) time per week. (Patient taking differently: Inject 2.5 mg under the skin 1 (one) time per week. PT HAS NOT BEGUN MOUNJARO/WILL START JUNE 16, 2024) 6 mL 3    triamterene-hydrochlorothiazid (Maxzide-25) 37.5-25 mg tablet TAKE 1 TABLET BY MOUTH EVERY DAY IN THE MORNING FOR 90 DAYS 90 tablet 3     No current facility-administered medications for this visit.     Review of Systems   Constitutional:  Positive for fatigue.   Gastrointestinal:         See HPI   Musculoskeletal:  Positive for arthralgias.   Neurological:  Positive for dizziness (occasional).   All other systems reviewed and are negative.    /88   Pulse 79   Temp 36.6 °C (97.9 °F) (Temporal)   Resp 16   Ht 1.702 m (5' 7\")   Wt (!) 151 kg (332 lb)   SpO2 99%   BMI 52.00 kg/m²     Physical Exam  Vitals reviewed.   Constitutional:       Appearance: She is obese.   HENT:      Head: Normocephalic " and atraumatic.      Mouth/Throat:      Mouth: Mucous membranes are moist.      Pharynx: Oropharynx is clear.   Eyes:      Extraocular Movements: Extraocular movements intact.      Pupils: Pupils are equal, round, and reactive to light.   Cardiovascular:      Rate and Rhythm: Normal rate and regular rhythm.      Comments: Distant heart sounds  Pulmonary:      Breath sounds: Normal breath sounds.   Abdominal:      General: Bowel sounds are normal.      Palpations: Abdomen is soft.   Musculoskeletal:      Comments: Mild bilateral lower extremity non-pitting edema noted   Skin:     General: Skin is warm and dry.   Neurological:      General: No focal deficit present.      Mental Status: She is alert and oriented to person, place, and time.   Psychiatric:         Mood and Affect: Mood normal.         Behavior: Behavior normal.        PAT AIRWAY:   Airway:     Mallampati::  III    TM distance::  >3 FB    Neck ROM::  Full   Teeth intact    ASA:  II  DASI RISK SCORE:  44.7  METS SCORE:  8.2  CHAD2 SCORE:  4.0%  REVISED CARDIAC RISK INDEX:  0.4%  STOP BANG SCORE:  4.0%    EKG (preliminary in PAT) - normal sinus rhythm  CMP, CBC done 5/6/2024    Assessment and Plan:     Abdominal bloating:  Colonoscopy and Esophagogastroduodenoscopy  Diabetes Mellitus - currently taking Jardiance, hemoglobin A1c was 6.8 on 5/6/2024  Hypertension - taking Maxzide and Cozaar  Obesity - BMI:  52.0  Obstructive Sleep Apnea - recently diagnosed - has not received CPAP    Jenny Hartley PA-C

## 2024-06-12 ENCOUNTER — OFFICE VISIT (OUTPATIENT)
Dept: PRIMARY CARE | Facility: CLINIC | Age: 49
End: 2024-06-12
Payer: COMMERCIAL

## 2024-06-12 VITALS
WEIGHT: 293 LBS | BODY MASS INDEX: 45.99 KG/M2 | RESPIRATION RATE: 16 BRPM | HEIGHT: 67 IN | SYSTOLIC BLOOD PRESSURE: 140 MMHG | HEART RATE: 81 BPM | OXYGEN SATURATION: 98 % | DIASTOLIC BLOOD PRESSURE: 80 MMHG

## 2024-06-12 DIAGNOSIS — E11.9 DIABETES MELLITUS WITHOUT COMPLICATION (MULTI): ICD-10-CM

## 2024-06-12 DIAGNOSIS — G47.33 MODERATE OBSTRUCTIVE SLEEP APNEA: Primary | ICD-10-CM

## 2024-06-12 DIAGNOSIS — K21.9 GERD WITHOUT ESOPHAGITIS: ICD-10-CM

## 2024-06-12 PROCEDURE — 3050F LDL-C >= 130 MG/DL: CPT | Performed by: INTERNAL MEDICINE

## 2024-06-12 PROCEDURE — 4010F ACE/ARB THERAPY RXD/TAKEN: CPT | Performed by: INTERNAL MEDICINE

## 2024-06-12 PROCEDURE — 3077F SYST BP >= 140 MM HG: CPT | Performed by: INTERNAL MEDICINE

## 2024-06-12 PROCEDURE — 3061F NEG MICROALBUMINURIA REV: CPT | Performed by: INTERNAL MEDICINE

## 2024-06-12 PROCEDURE — 99213 OFFICE O/P EST LOW 20 MIN: CPT | Performed by: INTERNAL MEDICINE

## 2024-06-12 PROCEDURE — 3044F HG A1C LEVEL LT 7.0%: CPT | Performed by: INTERNAL MEDICINE

## 2024-06-12 PROCEDURE — 3079F DIAST BP 80-89 MM HG: CPT | Performed by: INTERNAL MEDICINE

## 2024-06-12 PROCEDURE — 3008F BODY MASS INDEX DOCD: CPT | Performed by: INTERNAL MEDICINE

## 2024-06-12 ASSESSMENT — ENCOUNTER SYMPTOMS
LOSS OF SENSATION IN FEET: 0
COUGH: 0
CONSTIPATION: 0
NAUSEA: 1
ABDOMINAL PAIN: 0
SHORTNESS OF BREATH: 0
OCCASIONAL FEELINGS OF UNSTEADINESS: 0
DIARRHEA: 0
DIZZINESS: 0
ARTHRALGIAS: 1
DEPRESSION: 0
PALPITATIONS: 0

## 2024-06-12 ASSESSMENT — PAIN SCALES - GENERAL: PAINLEVEL: 0-NO PAIN

## 2024-06-12 NOTE — PROGRESS NOTES
"Subjective   Patient ID: Lore Olguin is a 48 y.o. female who presents for 1 month check up.    Pt is doing well overall. Reports abdominal pain accompanied by nausea. She recently changed her diet, cutting out carbohydrates, and is taking water pills. She has an upcoming colonoscopy that she is preparing for and needs to schedule upper GI XR. Takes walks regularly with her dog. PT also c/o sleep apnea, which she will try moderating with nasal mask.    Diagnostics Reviewed: 5/2024 sleep study - moderate severe apnea, will schedule autopap.  Labs Reviewed: 5/2024 H. Pylori antigen test negative, lupus test negative.            Review of Systems   Respiratory:  Negative for cough and shortness of breath.    Cardiovascular:  Negative for chest pain and palpitations.   Gastrointestinal:  Positive for nausea. Negative for abdominal pain, constipation and diarrhea.   Musculoskeletal:  Positive for arthralgias.   Neurological:  Negative for dizziness.       Objective   /80   Pulse 81   Resp 16   Ht 1.702 m (5' 7\")   Wt (!) 151 kg (332 lb)   SpO2 98%   BMI 52.00 kg/m²     Physical Exam  Cardiovascular:      Rate and Rhythm: Normal rate and regular rhythm.      Heart sounds: Normal heart sounds.   Pulmonary:      Breath sounds: Normal breath sounds.   Abdominal:      Palpations: Abdomen is soft. There is no hepatomegaly.      Tenderness: There is no abdominal tenderness.   Musculoskeletal:      Right lower leg: No edema.      Left lower leg: No edema.   Neurological:      Mental Status: She is alert and oriented to person, place, and time.      Gait: Gait normal.   Psychiatric:         Mood and Affect: Mood normal.         Behavior: Behavior normal.         Assessment/Plan   Diagnoses and all orders for this visit:  Moderate obstructive sleep apnea  -     Positive Airway Pressure (PAP) Therapy  Diabetes mellitus without complication (Multi)  GERD without esophagitis       Scribe Attestation  By signing my name " below, I, Geo Quesada   attest that this documentation has been prepared under the direction and in the presence of Fiordaliaz Mcdonald MD.

## 2024-06-13 ENCOUNTER — ANESTHESIA EVENT (OUTPATIENT)
Dept: GASTROENTEROLOGY | Facility: HOSPITAL | Age: 49
End: 2024-06-13
Payer: COMMERCIAL

## 2024-06-13 ENCOUNTER — HOSPITAL ENCOUNTER (OUTPATIENT)
Dept: GASTROENTEROLOGY | Facility: HOSPITAL | Age: 49
Discharge: HOME | End: 2024-06-13
Payer: COMMERCIAL

## 2024-06-13 ENCOUNTER — ANESTHESIA (OUTPATIENT)
Dept: GASTROENTEROLOGY | Facility: HOSPITAL | Age: 49
End: 2024-06-13
Payer: COMMERCIAL

## 2024-06-13 VITALS
OXYGEN SATURATION: 96 % | RESPIRATION RATE: 17 BRPM | SYSTOLIC BLOOD PRESSURE: 132 MMHG | BODY MASS INDEX: 45.99 KG/M2 | DIASTOLIC BLOOD PRESSURE: 75 MMHG | WEIGHT: 293 LBS | HEART RATE: 75 BPM | HEIGHT: 67 IN | TEMPERATURE: 96.8 F

## 2024-06-13 DIAGNOSIS — R10.84 ABDOMINAL PAIN, GENERALIZED: ICD-10-CM

## 2024-06-13 DIAGNOSIS — K21.9 GASTROESOPHAGEAL REFLUX DISEASE WITHOUT ESOPHAGITIS: ICD-10-CM

## 2024-06-13 DIAGNOSIS — Z12.11 ENCOUNTER FOR SCREENING COLONOSCOPY: ICD-10-CM

## 2024-06-13 DIAGNOSIS — R14.0 ABDOMINAL DISTENTION: ICD-10-CM

## 2024-06-13 PROCEDURE — 7100000002 HC RECOVERY ROOM TIME - EACH INCREMENTAL 1 MINUTE

## 2024-06-13 PROCEDURE — 7100000010 HC PHASE TWO TIME - EACH INCREMENTAL 1 MINUTE

## 2024-06-13 PROCEDURE — 7100000001 HC RECOVERY ROOM TIME - INITIAL BASE CHARGE

## 2024-06-13 PROCEDURE — 3700000001 HC GENERAL ANESTHESIA TIME - INITIAL BASE CHARGE

## 2024-06-13 PROCEDURE — 3700000002 HC GENERAL ANESTHESIA TIME - EACH INCREMENTAL 1 MINUTE

## 2024-06-13 PROCEDURE — 45380 COLONOSCOPY AND BIOPSY: CPT | Performed by: INTERNAL MEDICINE

## 2024-06-13 PROCEDURE — 43239 EGD BIOPSY SINGLE/MULTIPLE: CPT | Performed by: INTERNAL MEDICINE

## 2024-06-13 PROCEDURE — 2500000004 HC RX 250 GENERAL PHARMACY W/ HCPCS (ALT 636 FOR OP/ED): Performed by: ANESTHESIOLOGIST ASSISTANT

## 2024-06-13 PROCEDURE — 82947 ASSAY GLUCOSE BLOOD QUANT: CPT

## 2024-06-13 PROCEDURE — 7100000009 HC PHASE TWO TIME - INITIAL BASE CHARGE

## 2024-06-13 RX ORDER — FENTANYL CITRATE 50 UG/ML
50 INJECTION, SOLUTION INTRAMUSCULAR; INTRAVENOUS EVERY 5 MIN PRN
Status: DISCONTINUED | OUTPATIENT
Start: 2024-06-13 | End: 2024-06-13 | Stop reason: HOSPADM

## 2024-06-13 RX ORDER — MEPERIDINE HYDROCHLORIDE 25 MG/ML
12.5 INJECTION INTRAMUSCULAR; INTRAVENOUS; SUBCUTANEOUS EVERY 10 MIN PRN
Status: DISCONTINUED | OUTPATIENT
Start: 2024-06-13 | End: 2024-06-13 | Stop reason: HOSPADM

## 2024-06-13 RX ORDER — MIDAZOLAM HYDROCHLORIDE 1 MG/ML
1 INJECTION, SOLUTION INTRAMUSCULAR; INTRAVENOUS ONCE AS NEEDED
Status: DISCONTINUED | OUTPATIENT
Start: 2024-06-13 | End: 2024-06-13 | Stop reason: HOSPADM

## 2024-06-13 RX ORDER — SODIUM CHLORIDE, SODIUM LACTATE, POTASSIUM CHLORIDE, CALCIUM CHLORIDE 600; 310; 30; 20 MG/100ML; MG/100ML; MG/100ML; MG/100ML
INJECTION, SOLUTION INTRAVENOUS CONTINUOUS PRN
Status: DISCONTINUED | OUTPATIENT
Start: 2024-06-13 | End: 2024-06-13

## 2024-06-13 RX ORDER — SODIUM CHLORIDE, SODIUM LACTATE, POTASSIUM CHLORIDE, CALCIUM CHLORIDE 600; 310; 30; 20 MG/100ML; MG/100ML; MG/100ML; MG/100ML
100 INJECTION, SOLUTION INTRAVENOUS CONTINUOUS
Status: DISCONTINUED | OUTPATIENT
Start: 2024-06-13 | End: 2024-06-13 | Stop reason: HOSPADM

## 2024-06-13 RX ORDER — LIDOCAINE HYDROCHLORIDE 10 MG/ML
0.1 INJECTION INFILTRATION; PERINEURAL ONCE
Status: DISCONTINUED | OUTPATIENT
Start: 2024-06-13 | End: 2024-06-13 | Stop reason: HOSPADM

## 2024-06-13 RX ORDER — ONDANSETRON HYDROCHLORIDE 2 MG/ML
4 INJECTION, SOLUTION INTRAVENOUS ONCE AS NEEDED
Status: DISCONTINUED | OUTPATIENT
Start: 2024-06-13 | End: 2024-06-13 | Stop reason: HOSPADM

## 2024-06-13 RX ORDER — HYDRALAZINE HYDROCHLORIDE 20 MG/ML
5 INJECTION INTRAMUSCULAR; INTRAVENOUS EVERY 30 MIN PRN
Status: DISCONTINUED | OUTPATIENT
Start: 2024-06-13 | End: 2024-06-13 | Stop reason: HOSPADM

## 2024-06-13 RX ORDER — ALBUTEROL SULFATE 0.83 MG/ML
2.5 SOLUTION RESPIRATORY (INHALATION) ONCE AS NEEDED
Status: DISCONTINUED | OUTPATIENT
Start: 2024-06-13 | End: 2024-06-13 | Stop reason: HOSPADM

## 2024-06-13 RX ORDER — MIDAZOLAM HYDROCHLORIDE 1 MG/ML
INJECTION, SOLUTION INTRAMUSCULAR; INTRAVENOUS AS NEEDED
Status: DISCONTINUED | OUTPATIENT
Start: 2024-06-13 | End: 2024-06-13

## 2024-06-13 RX ORDER — PROPOFOL 10 MG/ML
INJECTION, EMULSION INTRAVENOUS AS NEEDED
Status: DISCONTINUED | OUTPATIENT
Start: 2024-06-13 | End: 2024-06-13

## 2024-06-13 SDOH — HEALTH STABILITY: MENTAL HEALTH: CURRENT SMOKER: 0

## 2024-06-13 ASSESSMENT — PAIN - FUNCTIONAL ASSESSMENT
PAIN_FUNCTIONAL_ASSESSMENT: 0-10

## 2024-06-13 ASSESSMENT — PAIN SCALES - GENERAL
PAINLEVEL_OUTOF10: 0 - NO PAIN
PAIN_LEVEL: 2
PAINLEVEL_OUTOF10: 0 - NO PAIN

## 2024-06-13 ASSESSMENT — PATIENT HEALTH QUESTIONNAIRE - PHQ9
1. LITTLE INTEREST OR PLEASURE IN DOING THINGS: NOT AT ALL
2. FEELING DOWN, DEPRESSED OR HOPELESS: NOT AT ALL
SUM OF ALL RESPONSES TO PHQ9 QUESTIONS 1 AND 2: 0

## 2024-06-13 ASSESSMENT — ENCOUNTER SYMPTOMS
OCCASIONAL FEELINGS OF UNSTEADINESS: 0
DEPRESSION: 0
LOSS OF SENSATION IN FEET: 1

## 2024-06-13 ASSESSMENT — COLUMBIA-SUICIDE SEVERITY RATING SCALE - C-SSRS
1. IN THE PAST MONTH, HAVE YOU WISHED YOU WERE DEAD OR WISHED YOU COULD GO TO SLEEP AND NOT WAKE UP?: NO
2. HAVE YOU ACTUALLY HAD ANY THOUGHTS OF KILLING YOURSELF?: NO
6. HAVE YOU EVER DONE ANYTHING, STARTED TO DO ANYTHING, OR PREPARED TO DO ANYTHING TO END YOUR LIFE?: NO

## 2024-06-13 NOTE — ANESTHESIA PREPROCEDURE EVALUATION
Patient: Lore Olguin    Procedure Information       Date/Time: 06/13/24 1400    Scheduled providers: Sushma Cortes MD; HEBER Ashley; Alexey Duval MD    Procedures:       COLONOSCOPY      EGD    Location: [facility]            Relevant Problems   No relevant active problems       Clinical information reviewed:   Tobacco  Allergies  Meds   Med Hx  Surg Hx  OB Status  Fam Hx  Soc   Hx        NPO Detail:  NPO/Void Status  Carbohydrate Drink Given Prior to Surgery? : Y  Date of Last Liquid: 06/13/24  Time of Last Liquid: 0800  Date of Last Solid: 06/11/24  Time of Last Solid: 2030  Last Intake Type: Clear fluids; Carbohydrate drink  Time of Last Void: 1244         Physical Exam    Airway  Mallampati: II  TM distance: >3 FB  Neck ROM: full     Cardiovascular - normal exam     Dental - normal exam     Pulmonary - normal exam     Abdominal - normal exam             Anesthesia Plan    History of general anesthesia?: yes  History of complications of general anesthesia?: no    ASA 2     general     The patient is not a current smoker.  Patient was not previously instructed to abstain from smoking on day of procedure.  Patient did not smoke on day of procedure.  Education provided regarding risk of obstructive sleep apnea.  intravenous induction   Postoperative administration of opioids is intended.  Trial extubation is planned.  Anesthetic plan and risks discussed with patient.  Use of blood products discussed with patient who consented to blood products.    Plan discussed with HEBER, attending and CRNA.

## 2024-06-13 NOTE — ANESTHESIA POSTPROCEDURE EVALUATION
Patient: Lore Olguin    Procedure Summary       Date: 06/13/24 Room / Location: [facility]    Anesthesia Start: 1345 Anesthesia Stop: 1431    Procedures:       COLONOSCOPY      EGD Diagnosis:       Encounter for screening colonoscopy      Abdominal distention      Abdominal pain, generalized      Gastroesophageal reflux disease without esophagitis    Scheduled Providers: Sushma Cortes MD; HEBER Ashley; Alexey Duval MD Responsible Provider: Alexey Duval MD    Anesthesia Type: general ASA Status: 2            Anesthesia Type: general    Vitals Value Taken Time   /63 06/13/24 1446   Temp 36.1 °C (97 °F) 06/13/24 1440   Pulse 78 06/13/24 1446   Resp 14 06/13/24 1446   SpO2 98 % 06/13/24 1446   Vitals shown include unfiled device data.    Anesthesia Post Evaluation    Patient location during evaluation: PACU  Patient participation: complete - patient participated  Level of consciousness: sleepy but conscious  Pain score: 2  Pain management: adequate  Multimodal analgesia pain management approach  Airway patency: patent  Cardiovascular status: acceptable  Respiratory status: acceptable  Hydration status: acceptable  Postoperative Nausea and Vomiting: none        No notable events documented.

## 2024-06-13 NOTE — PERIOPERATIVE NURSING NOTE
1449- Arrived to [facility] 20. Alert, denies pain and/or nausea. VSS.IV saline locked. Repositioned, set up with drink and snack.Call light within reach. Family updated on pt status.    1510- Dr Cortes to  bedside, discussed procedure findings, discharge POC and follow up. Questions answered. VSS. Denies pain and/or nausea. IV discontinued. Reviewed discharge instructions. Discharged home

## 2024-06-13 NOTE — DISCHARGE INSTRUCTIONS
You received anesthesia today. No drinking alcohol, driving,recreational drug use, or signing legal documents.

## 2024-06-14 LAB — GLUCOSE BLD MANUAL STRIP-MCNC: 115 MG/DL (ref 74–99)

## 2024-06-17 LAB
LABORATORY COMMENT REPORT: NORMAL
PATH REPORT.FINAL DX SPEC: NORMAL
PATH REPORT.GROSS SPEC: NORMAL
PATH REPORT.RELEVANT HX SPEC: NORMAL
PATH REPORT.TOTAL CANCER: NORMAL

## 2024-07-05 ENCOUNTER — APPOINTMENT (OUTPATIENT)
Dept: RADIOLOGY | Facility: HOSPITAL | Age: 49
End: 2024-07-05
Payer: COMMERCIAL

## 2024-08-07 ENCOUNTER — APPOINTMENT (OUTPATIENT)
Dept: OBSTETRICS AND GYNECOLOGY | Facility: CLINIC | Age: 49
End: 2024-08-07
Payer: COMMERCIAL

## 2024-08-27 ENCOUNTER — APPOINTMENT (OUTPATIENT)
Dept: OBSTETRICS AND GYNECOLOGY | Facility: CLINIC | Age: 49
End: 2024-08-27
Payer: COMMERCIAL

## 2024-10-10 ENCOUNTER — OFFICE VISIT (OUTPATIENT)
Dept: OBSTETRICS AND GYNECOLOGY | Facility: CLINIC | Age: 49
End: 2024-10-10
Payer: COMMERCIAL

## 2024-10-10 VITALS
HEIGHT: 67 IN | SYSTOLIC BLOOD PRESSURE: 141 MMHG | WEIGHT: 293 LBS | BODY MASS INDEX: 45.99 KG/M2 | DIASTOLIC BLOOD PRESSURE: 82 MMHG

## 2024-10-10 DIAGNOSIS — Z01.419 ENCOUNTER FOR ANNUAL ROUTINE GYNECOLOGICAL EXAMINATION: Primary | ICD-10-CM

## 2024-10-10 DIAGNOSIS — Z11.51 ENCOUNTER FOR SCREENING FOR HUMAN PAPILLOMAVIRUS (HPV): ICD-10-CM

## 2024-10-10 PROCEDURE — 3008F BODY MASS INDEX DOCD: CPT | Performed by: OBSTETRICS & GYNECOLOGY

## 2024-10-10 PROCEDURE — 99386 PREV VISIT NEW AGE 40-64: CPT | Performed by: OBSTETRICS & GYNECOLOGY

## 2024-10-10 ASSESSMENT — LIFESTYLE VARIABLES
AUDIT-C TOTAL SCORE: 0
HOW MANY STANDARD DRINKS CONTAINING ALCOHOL DO YOU HAVE ON A TYPICAL DAY: PATIENT DOES NOT DRINK
HOW OFTEN DO YOU HAVE A DRINK CONTAINING ALCOHOL: NEVER
HOW OFTEN DO YOU HAVE SIX OR MORE DRINKS ON ONE OCCASION: NEVER
SKIP TO QUESTIONS 9-10: 1

## 2024-10-10 ASSESSMENT — ENCOUNTER SYMPTOMS
OCCASIONAL FEELINGS OF UNSTEADINESS: 0
LOSS OF SENSATION IN FEET: 0
DEPRESSION: 0

## 2024-10-10 ASSESSMENT — PATIENT HEALTH QUESTIONNAIRE - PHQ9
1. LITTLE INTEREST OR PLEASURE IN DOING THINGS: NOT AT ALL
SUM OF ALL RESPONSES TO PHQ9 QUESTIONS 1 & 2: 0
2. FEELING DOWN, DEPRESSED OR HOPELESS: NOT AT ALL

## 2024-10-10 ASSESSMENT — PAIN SCALES - GENERAL: PAINLEVEL: 0-NO PAIN

## 2024-10-10 NOTE — PROGRESS NOTES
Annual  Subjective   HPI:  Lore Olguin is a 49 y.o. female  No LMP recorded. Patient has had an implant. for annual exam.    Complaints:   has mirena IUD 5/3/2018, placed in OR due to lost strings from previous one, no issues w/ it   Periods: none  Dysmenorrhea:  none    GYNH:   Current contraceptive   mirena iud  History of abnormal Pap smear:   none  History of abnormal mammogram:    none    OB History          2    Para   2    Term   2            AB        Living   2         SAB        IAB        Ectopic        Multiple        Live Births   2                  Past Medical History:   Diagnosis Date    Allergic     Edema     GERD (gastroesophageal reflux disease)     Hypertension     Nonscarring hair loss, unspecified 2015    Hair loss    Obesity     Other injury of unspecified body region, initial encounter 2015    Puncture wound    Personal history of other diseases of the circulatory system 2015    History of lymphadenitis    Personal history of other diseases of the respiratory system 2015    History of acute sinusitis    Sleep apnea     new diagnosis, has not received CPAP machine yet    Type 2 diabetes mellitus        Past Surgical History:   Procedure Laterality Date    ADENOIDECTOMY  2018    Adenoidectomy     SECTION, CLASSIC       Section     SECTION, CLASSIC      CHOLECYSTECTOMY      Cholecystectomy    DILATION AND CURETTAGE OF UTERUS      INTRAUTERINE DEVICE INSERTION      OTHER SURGICAL HISTORY  2019    Removal IUD    TONSILLECTOMY  2018    Tonsillectomy    UPPER GASTROINTESTINAL ENDOSCOPY         Prior to Admission medications    Medication Sig Start Date End Date Taking? Authorizing Provider   buPROPion XL (Wellbutrin XL) 150 mg 24 hr tablet Take 1 tablet (150 mg) by mouth once daily in the morning. Do not crush, chew, or split. 24 Yes Fiordaliza Mcdonald MD   empagliflozin (Jardiance) 10 mg Take 1  "tablet (10 mg) by mouth once daily. 24 Yes Fiordaliza Mcdonald MD   levonorgestrel (Mirena) 21 mcg/24 hours (8 yrs) 52 mg IUD 52 mg by intrauterine route 1 time.   Yes Historical Provider, MD   losartan (Cozaar) 25 mg tablet Take 1 tablet (25 mg) by mouth once daily. 24 Yes Fiordaliza Mcdonald MD   omeprazole (PriLOSEC) 40 mg DR capsule TAKE 1 CAPSULE BY MOUTH EVERY DAY 30 MINUTES BEFORE MORNING MEAL 24  Yes Fiordaliza Mcdonald MD   tirzepatide (Mounjaro) 2.5 mg/0.5 mL pen injector Inject 2.5 mg under the skin 1 (one) time per week. 24 Yes Fiordaliza Mcdonald MD   triamterene-hydrochlorothiazid (Maxzide-25) 37.5-25 mg tablet TAKE 1 TABLET BY MOUTH EVERY DAY IN THE MORNING FOR 90 DAYS 24  Yes Fiordaliza Mcdonald MD   loratadine (Claritin) 10 mg tablet Take 1 tablet (10 mg) by mouth once daily as needed for allergies.    Historical Provider, MD       Social History     Tobacco Use    Smoking status: Former     Current packs/day: 0.00     Average packs/day: 0.1 packs/day for 10.0 years (1.0 ttl pk-yrs)     Types: Cigarettes     Start date:      Quit date:      Years since quittin.7     Passive exposure: Never    Smokeless tobacco: Never   Vaping Use    Vaping status: Never Used   Substance Use Topics    Alcohol use: Never    Drug use: Never        Objective   /82   Ht 1.702 m (5' 7\")   Wt 146 kg (321 lb 6.4 oz)   BMI 50.34 kg/m²      General:   Alert and oriented, in no acute distress   Neck: Supple. No visible thyromegaly.    Breast/Axilla: Normal to palpation bilaterally without masses, skin changes, or nipple discharge.    Abdomen: Soft, non-tender, without masses or organomegaly   Vulva: Normal architecture without erythema, masses, or lesions.    Vagina: Normal mucosa without lesions, masses, or atrophy. No abnormal vaginal discharge.    Cervix: Normal without masses, lesions, or signs of cervicitis; +IUD strings   Uterus: Normal, mobile, non-enlarged uterus   Adnexa: Normal " without masses or lesions   Pelvic Floor normal   Psych Normal affect. Normal mood.      Assessment/Plan   Problem List Items Addressed This Visit    None  Visit Diagnoses         Codes    Encounter for annual routine gynecological examination    -  Primary Z01.419    Relevant Orders    THINPREP PAP TEST    HPV DNA High Risk With Genotype    Encounter for screening for human papillomavirus (HPV)     Z11.51    Relevant Orders    THINPREP PAP TEST    HPV DNA High Risk With Genotype          Routine annual  OB/GYN Preventive:     - Pap smear indicated every 3-5 years if normal and otherwise low risk   - Self breast exam monthly and clinical breast examination/mammogram yearly   - Screening colonoscopy recommended starting age 45, then Q3-10 years depending on testing and family history; cscope 6/2024  - Genitourinary skin hygiene discussed.  - Diet/Weight management discussed.  - Exercise 30-60 minutes 3-5 times/day    Bharathi Swann MD

## 2024-10-21 LAB
CYTOLOGY CMNT CVX/VAG CYTO-IMP: NORMAL
HPV HR 12 DNA GENITAL QL NAA+PROBE: NEGATIVE
HPV HR GENOTYPES PNL CVX NAA+PROBE: NEGATIVE
HPV16 DNA SPEC QL NAA+PROBE: NEGATIVE
HPV18 DNA SPEC QL NAA+PROBE: NEGATIVE
LAB AP HPV GENOTYPE QUESTION: YES
LAB AP HPV HR: NORMAL
LABORATORY COMMENT REPORT: NORMAL
PATH REPORT.TOTAL CANCER: NORMAL

## 2024-12-02 DIAGNOSIS — N39.0 URINARY TRACT INFECTION WITHOUT HEMATURIA, SITE UNSPECIFIED: ICD-10-CM

## 2024-12-02 RX ORDER — CEPHALEXIN 500 MG/1
500 CAPSULE ORAL 2 TIMES DAILY
Qty: 10 CAPSULE | Refills: 0 | Status: SHIPPED | OUTPATIENT
Start: 2024-12-02 | End: 2024-12-07

## 2024-12-02 RX ORDER — CEPHALEXIN 500 MG/1
500 CAPSULE ORAL 2 TIMES DAILY
COMMUNITY
End: 2024-12-02 | Stop reason: SDUPTHER

## 2024-12-06 ENCOUNTER — APPOINTMENT (OUTPATIENT)
Dept: AUDIOLOGY | Facility: CLINIC | Age: 49
End: 2024-12-06
Payer: COMMERCIAL

## 2024-12-06 DIAGNOSIS — H93.293 ABNORMAL AUDITORY PERCEPTION OF BOTH EARS: Primary | ICD-10-CM

## 2024-12-06 DIAGNOSIS — H90.3 SENSORINEURAL HEARING LOSS (SNHL) OF BOTH EARS: ICD-10-CM

## 2024-12-06 PROCEDURE — 92550 TYMPANOMETRY & REFLEX THRESH: CPT | Performed by: AUDIOLOGIST

## 2024-12-06 PROCEDURE — 92557 COMPREHENSIVE HEARING TEST: CPT | Performed by: AUDIOLOGIST

## 2024-12-06 NOTE — PROGRESS NOTES
"  AUDIOLOGY ADULT AUDIOMETRIC EVALUATION    Name:  Lore Olguin  :  1975  Age:  49 y.o.  Date of Evaluation:  2024    Reason for visit: Ms. Olguin is seen in the clinic today at the request of otolaryngology for an audiologic evaluation.     HISTORY  Patient complains of feeling of muffled  and plugged ears and the need to \"pop\" ears for about 2 months. She has had tubes as a child and does not work in noise. She denies tinnitus and dizzy.  She feels the right is more muffled than the left.    EVALUATION  See scanned audiogram: “Media” > “Audiology Report”.      RESULTS  Otoscopic Evaluation:  Right Ear: clear ear canal  Left Ear: clear ear canal    Immittance Measures:  Tympanometry:  Right Ear: Type A, normal tympanic membrane mobility with normal middle ear pressure   Left Ear: Type A, normal tympanic membrane mobility with normal middle ear pressure     Acoustic Reflexes:  Ipsilateral Right Ear:  95 95 95 95   Ipsilateral Left Ear:    95 95 95 95   Contralateral Right Ear: did not evaluate  Contralateral Left Ear: did not evaluate    Distortion Product Otoacoustic Emissions (DPOAEs):  Right Ear: PASS : 1.5- 5 K HZ    REFER:  1 AND 6-8 K HZ  Left Ear:   PASS : 1.5-5 K HZ     REFER: 1, 6-8 K HZ.    Audiometry:  Test Technique and Reliability: BEHAVIORAL  Standard audiometry via supra-aural headphones. Reliability is good.    Pure tone air and bone conduction audiometry:  Right Ear: WITHIN NORMAL LIMLITS TO 4 K HZ WITH A MILD DROP AT 6 K HZ RECOVERING TO WITHIN NORMAL LIMITS AT 8 K HZ.    Left Ear: WITHIN NORMAL LIMTS TO 4 K HZ WITH A MILD DROP AT 6-8 K HZ. SLLIGHT ASYMMETRY 6-8 K HZ    Speech Audiometry (Word Recognition Scores):   Right Ear:  100%   Left Ear:     100%    IMPRESSIONS  Within normal limits to 4 KHZ with a mild drop at 6 KHZ right ear and 6-8 K HZleft ear with a feeling of muffled , plugged ears.  The presence of acoustic reflexes within normal intensity limits is consistent with " normal middle ear and brainstem function, and suggests that auditory sensitivity is not significantly impaired. An elevated or absent acoustic reflex threshold is consistent with a middle ear disorder, hearing loss in the stimulated ear, and/or interruption of neural innervation of the stapedius muscle. Present DPOAEs suggest normal/near normal cochlear outer hair cell function and are consistent with no greater than a mild hearing loss at those frequencies. Absent DPOAEs are consistent with abnormal cochlear outer hair cell function and some degree of hearing loss at those frequencies.    RECOMMENDATIONS  - Follow up with otolaryngology as scheduled for  two months  feeling of plugged ears and very slight asymmetry.   - Audiologic evaluation as needed.  - Annual audiologic evaluation, sooner if an acute change is noted.  - Audiologic evaluation in conjunction with otologic care, if an acute change is noted, and/or annually.  - Follow-up with medical care team as planned.    PATIENT EDUCATION  Discussed results, impressions and recommendations with the patient. Questions were addressed and the patient was encouraged to contact our office should concerns arise.    Time for this encounter: 35 minutes     Lonny Wheat  Licensed Audiologist

## 2025-02-03 ENCOUNTER — APPOINTMENT (OUTPATIENT)
Dept: OTOLARYNGOLOGY | Facility: CLINIC | Age: 50
End: 2025-02-03
Payer: COMMERCIAL

## 2025-02-18 ENCOUNTER — APPOINTMENT (OUTPATIENT)
Dept: OTOLARYNGOLOGY | Facility: CLINIC | Age: 50
End: 2025-02-18
Payer: COMMERCIAL

## 2025-02-18 VITALS — WEIGHT: 293 LBS | BODY MASS INDEX: 45.99 KG/M2 | HEIGHT: 67 IN

## 2025-02-18 DIAGNOSIS — L29.9 EAR ITCH: ICD-10-CM

## 2025-02-18 DIAGNOSIS — H90.3 BILATERAL SENSORINEURAL HEARING LOSS: ICD-10-CM

## 2025-02-18 DIAGNOSIS — H93.8X1 PRESSURE SENSATION IN RIGHT EAR: Primary | ICD-10-CM

## 2025-02-18 DIAGNOSIS — L29.9 EAR ITCHING: ICD-10-CM

## 2025-02-18 DIAGNOSIS — L21.9 SEBORRHEIC DERMATITIS, UNSPECIFIED: ICD-10-CM

## 2025-02-18 PROCEDURE — 1036F TOBACCO NON-USER: CPT | Performed by: OTOLARYNGOLOGY

## 2025-02-18 PROCEDURE — 3008F BODY MASS INDEX DOCD: CPT | Performed by: OTOLARYNGOLOGY

## 2025-02-18 PROCEDURE — 99203 OFFICE O/P NEW LOW 30 MIN: CPT | Performed by: OTOLARYNGOLOGY

## 2025-02-18 RX ORDER — FLUOCINOLONE ACETONIDE 0.11 MG/ML
OIL AURICULAR (OTIC)
Qty: 1 ML | Refills: 11 | Status: SHIPPED | OUTPATIENT
Start: 2025-02-18

## 2025-02-18 NOTE — PROGRESS NOTES
Chief Complaint   Patient presents with    New Patient Visit     NP- AUDIO FOLLOW UP, EARS FEEL PLUGGED     HPI:  Lore Olguin is a 49 y.o. female who complains of a 6-month history of some right ear pressure and somewhat wet feeling.  Can itch at times.  May have some subjective hearing loss as well.  No pain, drainage, dizziness.  She does have a significant history of TMJ and bruxism and does wear a nightguard.  She had a hearing test in 2024 which did show some mildest of high-frequency sensorineural hearing loss which was symmetric    PMH:  Past Medical History:   Diagnosis Date    Allergic     Edema     GERD (gastroesophageal reflux disease)     Hypertension     Nonscarring hair loss, unspecified 2015    Hair loss    Obesity     Other injury of unspecified body region, initial encounter 2015    Puncture wound    Personal history of other diseases of the circulatory system 2015    History of lymphadenitis    Personal history of other diseases of the respiratory system 2015    History of acute sinusitis    Sleep apnea     new diagnosis, has not received CPAP machine yet    Type 2 diabetes mellitus      Past Surgical History:   Procedure Laterality Date    ADENOIDECTOMY  2018    Adenoidectomy     SECTION, CLASSIC       Section     SECTION, CLASSIC      CHOLECYSTECTOMY      Cholecystectomy    DILATION AND CURETTAGE OF UTERUS      INTRAUTERINE DEVICE INSERTION      OTHER SURGICAL HISTORY  2019    Removal IUD    TONSILLECTOMY  2018    Tonsillectomy    UPPER GASTROINTESTINAL ENDOSCOPY           Medications:     Current Outpatient Medications:     buPROPion XL (Wellbutrin XL) 150 mg 24 hr tablet, Take 1 tablet (150 mg) by mouth once daily in the morning. Do not crush, chew, or split., Disp: 90 tablet, Rfl: 3    empagliflozin (Jardiance) 10 mg, Take 1 tablet (10 mg) by mouth once daily., Disp: 90 tablet, Rfl: 3    levonorgestrel (Mirena)  "21 mcg/24 hours (8 yrs) 52 mg IUD, 52 mg by intrauterine route 1 time., Disp: , Rfl:     losartan (Cozaar) 25 mg tablet, Take 1 tablet (25 mg) by mouth once daily., Disp: 100 tablet, Rfl: 3    omeprazole (PriLOSEC) 40 mg DR capsule, TAKE 1 CAPSULE BY MOUTH EVERY DAY 30 MINUTES BEFORE MORNING MEAL, Disp: 90 capsule, Rfl: 3    triamterene-hydrochlorothiazid (Maxzide-25) 37.5-25 mg tablet, TAKE 1 TABLET BY MOUTH EVERY DAY IN THE MORNING FOR 90 DAYS, Disp: 90 tablet, Rfl: 3    fluocinolone (DermOtic) 0.01 % ear drops, 4 drops to the affected ear/s twice daily as needed for itching. 1 bottle. 11 refills., Disp: 1 mL, Rfl: 11    loratadine (Claritin) 10 mg tablet, Take 1 tablet (10 mg) by mouth once daily as needed for allergies. (Patient not taking: Reported on 2/18/2025), Disp: , Rfl:     tirzepatide (Mounjaro) 2.5 mg/0.5 mL pen injector, Inject 2.5 mg under the skin 1 (one) time per week., Disp: 6 mL, Rfl: 3     Allergies:  Allergies   Allergen Reactions    Morphine Nausea/vomiting    Mobic [Meloxicam] Diarrhea, Dizziness and GI Upset        ROS:  Review of systems normal unless stated otherwise in the HPI and/or PMH.    Physical Exam:  Height 1.702 m (5' 7\"), weight 142 kg (314 lb). Body mass index is 49.18 kg/m².     GENERAL APPEARANCE: Well developed and well nourished.  Alert and oriented in no acute distress.  Normal vocal quality.      HEAD/FACE: No erythema or edema or facial tenderness.  Normal facial nerve function bilaterally.    EAR:       EXTERNAL: Normal pinnas and external auditory canals without lesion or obstructing wax.  Dry, no wax       MIDDLE EAR: Tympanic membranes intact and mobile with normal landmarks.  Middle ear space appears well aerated.       TUBE STATUS: N/A       MASTOID CAVITY: N/A       HEARING: Gross hearing assessment is within normal limits.      NOSE:       VISUALIZED USING: Anterior rhinoscopy with headlight and nasal speculum.       DORSUM: Midline, nontraumatic appearance.       " MUCOSA: Normal-appearing.       SECRETIONS: Normal.       SEPTUM: Midline and nonobstructing.       INFERIOR TURBINATES: Normal.       MIDDLE TURBINATES/MEATUS: N/A       BLEEDING: N/A         ORAL CAVITY/PHARYNX:       TEETH: Adequate dentition.       TONGUE: No mass or lesion.  Normal mobility.       FLOOR OF MOUTH: No mass or lesion.       PALATE: Normal hard palate, soft palate, and uvula.       OROPHARYNX: Normal without mass or lesion.       BUCCAL MUCOSA/GBS: Normal without mass or lesion.       LIPS: Normal.    LARYNX/HYPOPHARYNX/NASOPHARYNX: N/A    NECK: No palpable masses or abnormal adenopathy.  Trachea is midline.    THYROID: No thyromegaly or palpable nodule.    SALIVARY GLANDS: Normal bilateral parotid and submandibular glands by inspection and palpation.    TMJ's: Normal.    NEURO: Cranial nerve exam grossly normal bilaterally.       Assessment/Plan   Lore was seen today for new patient visit.  Diagnoses and all orders for this visit:  Pressure sensation in right ear (Primary)  Ear itch  Seborrheic dermatitis, unspecified  Bilateral sensorineural hearing loss  Ear itching  -     fluocinolone (DermOtic) 0.01 % ear drops; 4 drops to the affected ear/s twice daily as needed for itching. 1 bottle. 11 refills.     Reassured no evidence of any significant disease.  Symptoms could be related to her TMJ with some referred symptoms versus some seborrheic dermatitis.  Educated about her TMJ treatment as well as trial of dandruff shampoo washes and Derm otic drops  Follow up if symptoms worsen or fail to improve.     Jean Turner MD

## 2025-03-14 PROBLEM — I10 HYPERTENSION: Status: ACTIVE | Noted: 2025-03-14

## 2025-03-14 PROBLEM — R60.0 LOWER LEG EDEMA: Status: ACTIVE | Noted: 2025-03-14

## 2025-03-14 PROBLEM — K80.20 GALLBLADDER CALCULUS: Status: ACTIVE | Noted: 2025-03-14

## 2025-03-14 PROBLEM — E03.8 SUBCLINICAL HYPOTHYROIDISM: Status: ACTIVE | Noted: 2025-03-14

## 2025-03-14 PROBLEM — F32.A DEPRESSION: Status: ACTIVE | Noted: 2025-03-14

## 2025-03-14 PROBLEM — G47.33 OBSTRUCTIVE SLEEP APNEA SYNDROME: Status: ACTIVE | Noted: 2025-03-14

## 2025-03-14 PROBLEM — F41.9 ANXIETY: Status: ACTIVE | Noted: 2025-03-14

## 2025-03-14 PROBLEM — R53.83 FATIGUE: Status: ACTIVE | Noted: 2025-03-14

## 2025-03-14 PROBLEM — E66.813 CLASS 3 SEVERE OBESITY DUE TO EXCESS CALORIES WITHOUT SERIOUS COMORBIDITY IN ADULT: Status: ACTIVE | Noted: 2025-03-14

## 2025-03-14 PROBLEM — M54.16 ACUTE LUMBAR RADICULOPATHY: Status: ACTIVE | Noted: 2025-03-14

## 2025-03-14 PROBLEM — E66.01 CLASS 3 SEVERE OBESITY DUE TO EXCESS CALORIES WITHOUT SERIOUS COMORBIDITY IN ADULT: Status: ACTIVE | Noted: 2025-03-14

## 2025-03-21 ENCOUNTER — APPOINTMENT (OUTPATIENT)
Dept: ENDOCRINOLOGY | Facility: CLINIC | Age: 50
End: 2025-03-21
Payer: COMMERCIAL

## 2025-03-21 VITALS
DIASTOLIC BLOOD PRESSURE: 78 MMHG | SYSTOLIC BLOOD PRESSURE: 126 MMHG | BODY MASS INDEX: 45.99 KG/M2 | HEIGHT: 67 IN | WEIGHT: 293 LBS

## 2025-03-21 DIAGNOSIS — E78.5 HYPERLIPIDEMIA, UNSPECIFIED HYPERLIPIDEMIA TYPE: ICD-10-CM

## 2025-03-21 DIAGNOSIS — E11.9 TYPE 2 DIABETES MELLITUS WITHOUT COMPLICATION, WITHOUT LONG-TERM CURRENT USE OF INSULIN (MULTI): Primary | ICD-10-CM

## 2025-03-21 DIAGNOSIS — I10 HYPERTENSION, UNSPECIFIED TYPE: ICD-10-CM

## 2025-03-21 PROCEDURE — 3078F DIAST BP <80 MM HG: CPT | Performed by: INTERNAL MEDICINE

## 2025-03-21 PROCEDURE — 3008F BODY MASS INDEX DOCD: CPT | Performed by: INTERNAL MEDICINE

## 2025-03-21 PROCEDURE — 99204 OFFICE O/P NEW MOD 45 MIN: CPT | Performed by: INTERNAL MEDICINE

## 2025-03-21 PROCEDURE — 3074F SYST BP LT 130 MM HG: CPT | Performed by: INTERNAL MEDICINE

## 2025-03-21 PROCEDURE — 4010F ACE/ARB THERAPY RXD/TAKEN: CPT | Performed by: INTERNAL MEDICINE

## 2025-03-21 RX ORDER — TIRZEPATIDE 5 MG/.5ML
5 INJECTION, SOLUTION SUBCUTANEOUS
Qty: 2 ML | Refills: 0 | Status: SHIPPED | OUTPATIENT
Start: 2025-03-21

## 2025-03-21 NOTE — PROGRESS NOTES
Patient ID: Lore Olguin is a 49 y.o. female who presents for Weight Loss and Diabetes.  HPI  The patient is referred for evaluation of type 2 diabetes.    This is a 49-year-old female who was diagnosed last May with a hemoglobin A1c 6.8%.    She did have a hemoglobin A1c in July 2023 that was 6.8% as well and in December 2021 she had a hemoglobin A1c of 6.4%.    She has no complications from diabetes.    She is currently taking Jardiance 10 mg and Mounjaro 2.5 mg.    She has significantly adjusted her diet and is lost about 40 pounds over the last year.    She states she still does not feel well.    She has weakness in her legs with swelling numbness and fatigue.    She has never been on metformin.    She has a past history of sleep apnea that is not currently being treated GERD edema gestational diabetes 13 years ago and positive ELLIOTT.    Socially she is  works as a  non-smoker nondrinker.    Family history positive for diabetes in the grandfather and great-grandmother negative for thyroid.    ROS  Comprehensive review of systems is negative.    Objective   Physical Exam  Visit Vitals  /78      Vitals:    03/21/25 1610   Weight: 141 kg (310 lb)      Body mass index is 48.55 kg/m².      Alert and oriented x3  In no distress  No focal neurologic deficits  No supraclavicular or dorsal fat  No purple striae  Integument intact    ENT normal. No adenopathy  Fundi normal  Thyroid palpable and normal. No nodules  Chest clear to auscultation  Heart sounds are normal  Abdomen nontender. Bowel sounds normal. No organomegaly  Feet are okay  Normal vibration and monofilament sensation normal pulses, no lesions    Current Outpatient Medications   Medication Sig Dispense Refill    buPROPion XL (Wellbutrin XL) 150 mg 24 hr tablet Take 1 tablet (150 mg) by mouth once daily in the morning. Do not crush, chew, or split. 90 tablet 3    empagliflozin (Jardiance) 10 mg Take 1 tablet (10 mg) by mouth once  daily. 90 tablet 3    fluocinolone (DermOtic) 0.01 % ear drops 4 drops to the affected ear/s twice daily as needed for itching. 1 bottle. 11 refills. 1 mL 11    levonorgestrel (Mirena) 21 mcg/24 hours (8 yrs) 52 mg IUD 52 mg by intrauterine route 1 time.      losartan (Cozaar) 25 mg tablet Take 1 tablet (25 mg) by mouth once daily. 100 tablet 3    omeprazole (PriLOSEC) 40 mg DR capsule TAKE 1 CAPSULE BY MOUTH EVERY DAY 30 MINUTES BEFORE MORNING MEAL 90 capsule 3    triamterene-hydrochlorothiazid (Maxzide-25) 37.5-25 mg tablet TAKE 1 TABLET BY MOUTH EVERY DAY IN THE MORNING FOR 90 DAYS 90 tablet 3     No current facility-administered medications for this visit.       Assessment/Plan     1.  Type 2 diabetes  2.  Edema  3.  Fatigue  4.  Hypertension  5.  GERD  6.  Sleep apnea    We discussed the pathophysiology of diabetes, insulin resistance and insulin insufficiency. We discussed risks for complications, goals for treatment, as well as options for treatment.    Will work to maximize the Mounjaro.    We discussed the potential impact of weight loss on her other medical conditions.    We discussed her fluid retention and fatigue potentially being connected to the untreated sleep apnea which should also be impacted by the weight loss.    We discussed increasing the Jardiance at some point and considering metformin down the road.    Will have her meet with nutrition.    Will check hemoglobin A1c ALT BMP TSH urine microalbumin today.    She will follow-up with me in 3 months sooner as needed.

## 2025-03-22 LAB
ALBUMIN/CREAT UR: NORMAL
ALT SERPL-CCNC: 31 U/L (ref 6–29)
ANION GAP SERPL CALCULATED.4IONS-SCNC: 11 MMOL/L (CALC) (ref 7–17)
BUN SERPL-MCNC: 9 MG/DL (ref 7–25)
BUN/CREAT SERPL: ABNORMAL (CALC) (ref 6–22)
CALCIUM SERPL-MCNC: 9.8 MG/DL (ref 8.6–10.2)
CHLORIDE SERPL-SCNC: 98 MMOL/L (ref 98–110)
CO2 SERPL-SCNC: 29 MMOL/L (ref 20–32)
CREAT SERPL-MCNC: 0.88 MG/DL (ref 0.5–0.99)
CREAT UR-MCNC: NORMAL MG/DL
EGFRCR SERPLBLD CKD-EPI 2021: 81 ML/MIN/1.73M2
EST. AVERAGE GLUCOSE BLD GHB EST-MCNC: 140 MG/DL
EST. AVERAGE GLUCOSE BLD GHB EST-SCNC: 7.7 MMOL/L
GLUCOSE SERPL-MCNC: 153 MG/DL (ref 65–99)
HBA1C MFR BLD: 6.5 % OF TOTAL HGB
MICROALBUMIN UR-MCNC: NORMAL
POTASSIUM SERPL-SCNC: 3.8 MMOL/L (ref 3.5–5.3)
SODIUM SERPL-SCNC: 138 MMOL/L (ref 135–146)
TSH SERPL-ACNC: 1.28 MIU/L

## 2025-03-25 LAB
ALBUMIN/CREAT UR: 2 MG/G CREAT
ALT SERPL-CCNC: 31 U/L (ref 6–29)
ANION GAP SERPL CALCULATED.4IONS-SCNC: 11 MMOL/L (CALC) (ref 7–17)
BUN SERPL-MCNC: 9 MG/DL (ref 7–25)
BUN/CREAT SERPL: ABNORMAL (CALC) (ref 6–22)
CALCIUM SERPL-MCNC: 9.8 MG/DL (ref 8.6–10.2)
CHLORIDE SERPL-SCNC: 98 MMOL/L (ref 98–110)
CO2 SERPL-SCNC: 29 MMOL/L (ref 20–32)
CREAT SERPL-MCNC: 0.88 MG/DL (ref 0.5–0.99)
CREAT UR-MCNC: 86 MG/DL (ref 20–275)
EGFRCR SERPLBLD CKD-EPI 2021: 81 ML/MIN/1.73M2
EST. AVERAGE GLUCOSE BLD GHB EST-MCNC: 140 MG/DL
EST. AVERAGE GLUCOSE BLD GHB EST-SCNC: 7.7 MMOL/L
GLUCOSE SERPL-MCNC: 153 MG/DL (ref 65–99)
HBA1C MFR BLD: 6.5 % OF TOTAL HGB
MICROALBUMIN UR-MCNC: 0.2 MG/DL
POTASSIUM SERPL-SCNC: 3.8 MMOL/L (ref 3.5–5.3)
SODIUM SERPL-SCNC: 138 MMOL/L (ref 135–146)
TSH SERPL-ACNC: 1.28 MIU/L

## 2025-04-18 ENCOUNTER — NUTRITION (OUTPATIENT)
Facility: CLINIC | Age: 50
End: 2025-04-18
Payer: COMMERCIAL

## 2025-04-18 DIAGNOSIS — E11.9 TYPE 2 DIABETES MELLITUS WITHOUT COMPLICATION, WITHOUT LONG-TERM CURRENT USE OF INSULIN: ICD-10-CM

## 2025-04-18 NOTE — PROGRESS NOTES
"Nutrition Initial Assessment:     Patient Lore Olguin is a 49 y.o. female being seen at [facility] for DM2.    1. Type 2 diabetes mellitus without complication, without long-term current use of insulin  Referral to Nutrition Services          Nutrition Assessment    Problem List[1]  Nutrition History:  Food & Nutrition History: Pt being seen today for DM2 and weight loss.   Currently taking Mounjaro since June 2024.  Dosage recently increase to 5mg.  Lost 46# in 3 months when first starting Mounjaro, but has since plateud, even with dosage increase. Pt states her appetite is greatly reduced \"but I am still not losing weight.\" Pt also having constipation she believes is due to Mounjaro. Pt states she has drastically reduced starches and pasta.  Also states she gets full very quickly (half the meal or less).  Pt has been given advice that carbs are a problem so she's attempting to reduce carbs.  Food Allergies:    Food Intolerances: None  Vitamin/mineral intake:       Prescription medications:    GI Symptoms:  ; Occurring >2 weeks?    Oral Problems:  ; Dentition:    Sleep Habits: 5-6 hrs continuous    Diet Recall:  Meal 1: Breakfast at 7-9am: yogurt, fruit and maybe an egg  Meal 2: Skips lunch  Meal 3: Dinner 5-8pm depending on work. Chx salads, or meat and veggies, sometimes starch or mashed cauliflower  Snacks: sometime sugar free jello  Food Variety:    Oral Nutrition Supplement Use:    Fluid Intake:    Energy Intake:        Food Preparation:  Cooking:    Grocery Shopping:    Dining Out:      Physical Activity:   No plan at this time. Feels tired and lethargic. On feet all day.    Food Security/Insecurity:      Anthropometrics:                            Weight History:   Daily Weight  03/21/25 : 141 kg (310 lb)  02/18/25 : 142 kg (314 lb)  10/10/24 : 146 kg (321 lb 6.4 oz)  06/13/24 : 148 kg (326 lb)  06/12/24 : (!) 151 kg (332 lb)  06/10/24 : (!) 151 kg (332 lb)  05/16/24 : (!) 154 kg (340 lb)  05/13/24 " ": (!) 154 kg (339 lb)  05/02/24 : (!) 157 kg (347 lb)  11/15/23 : (!) 152 kg (335 lb)    Weight Change %:  Weight History / % Weight Change: Down 46# in last 11 months. Weighs herself 2x per week.    Nutrition Focused Physical Exam Findings:  Deferred as pt visually appears well-nourished with no signs of muscle or subcutaneous fat losses  Subcutaneous Fat Loss:        Muscle Wasting:       Physical Findings:          Nutrition Significant Labs:  Last Chem:     Chemistry    Lab Results   Component Value Date/Time     03/21/2025 1615    K 3.8 03/21/2025 1615    CL 98 03/21/2025 1615    CO2 29 03/21/2025 1615    BUN 9 03/21/2025 1615    CREATININE 0.88 03/21/2025 1615    Lab Results   Component Value Date/Time    CALCIUM 9.8 03/21/2025 1615    ALKPHOS 110 05/06/2024 0910    AST 11 05/06/2024 0910    ALT 31 (H) 03/21/2025 1615    BILITOT 0.3 05/06/2024 0910       , Lipid Panel Trend:    Recent Labs     05/06/24  0910 07/27/23  1028 12/15/21  1058   CHOL 201* 219* 194   HDL 45.0* 55 55   LDLCALC 131* 139* 117   TRIG 125 126 112   , Vitamin B12: No results found for: \"PIFIJYTW68\" , Folate: No results found for: \"FOLATE\" , and Vitamin D: No results found for: \"VITD25\"     Medications:  Current Outpatient Medications   Medication Instructions    buPROPion XL (WELLBUTRIN XL) 150 mg, oral, Every morning, Do not crush, chew, or split.    empagliflozin (JARDIANCE) 10 mg, oral, Daily    fluocinolone (DermOtic) 0.01 % ear drops 4 drops to the affected ear/s twice daily as needed for itching. 1 bottle. 11 refills.    levonorgestrel (Mirena) 21 mcg/24 hours (8 yrs) 52 mg IUD 1 each, Once    losartan (COZAAR) 25 mg, oral, Daily    Mounjaro 5 mg, subcutaneous, Every 7 days    omeprazole (PriLOSEC) 40 mg DR capsule TAKE 1 CAPSULE BY MOUTH EVERY DAY 30 MINUTES BEFORE MORNING MEAL    triamterene-hydrochlorothiazid (Maxzide-25) 37.5-25 mg tablet TAKE 1 TABLET BY MOUTH EVERY DAY IN THE MORNING FOR 90 DAYS        Estimated " Needs:  1800 kcal  80 gm PRO       Nutrition Diagnosis   Inadequate energy intake related to reduced appetite and early satiety from Mounjaro use as evidenced by reported weight plateau, inability to consume full meals, and reduced intake of carbohydrates and total food volume.    Food and nutrition-related knowledge deficit related to misconceptions about carbohydrate intake and weight loss while using Mounjaro as evidenced by self-reported drastic reduction in carbohydrate consumption, continued weight plateau, and symptoms of constipation and inadequate meal intake.    Nutrition Interventions:     Patient Goals:     Use Cronometer to track food intake at least 3-4 days per week to monitor protein and vitamin and mineral intake. This will help identify any nutritional gaps related to reduced appetite, support weight plateau evaluation, and guide personalized adjustments.    Support sustainable weight loss by maintaining adequate nutrition despite appetite suppression.  Aim for 3 balanced meals per day and snacks as needed.     Optimize blood glucose control through balanced meals that include protein, carbs and fat.     Reintroduce nutrient-dense carbohydrates strategically (e.g., fruit, root vegetables,) to maintain metabolic flexibility and energy.    Aim for 80 protein/day spread across the day to support lean mass preservation. Eat small frequent meals if appetite suppression is an issue.       Readiness to Change : Fair  Level of Understanding : Fair  Anticipated Compliant : Fair         Nutrition Monitoring and Evaluation      Weight trend     Food logs or Cronometer entries to assess total nutrient intake    Stool frequency and consistency     Energy levels and satiety patterns throughout the day    Blood glucose response to balanced meals    GI symptom tracking (e.g., bloating, constipation, meal tolerance)      Follow Up: 4-6 weeks                [1]   Patient Active Problem List  Diagnosis    Acute  lumbar radiculopathy    Anxiety    Depression    Fatigue    Gallbladder calculus    Hypertension    Lower leg edema    Class 3 severe obesity due to excess calories without serious comorbidity in adult    Obstructive sleep apnea syndrome    Subclinical hypothyroidism    Type 2 diabetes mellitus without complication, without long-term current use of insulin    Hyperlipidemia

## 2025-04-25 ENCOUNTER — TELEPHONE (OUTPATIENT)
Dept: PRIMARY CARE | Facility: CLINIC | Age: 50
End: 2025-04-25

## 2025-04-25 DIAGNOSIS — E11.9 DIABETES MELLITUS WITHOUT COMPLICATION: Primary | ICD-10-CM

## 2025-04-25 RX ORDER — TIRZEPATIDE 7.5 MG/.5ML
7.5 INJECTION, SOLUTION SUBCUTANEOUS
Qty: 2 ML | Refills: 0 | Status: SHIPPED | OUTPATIENT
Start: 2025-04-25

## 2025-04-25 NOTE — PROGRESS NOTES
Patient ID: Lore Olguin is a 49 y.o. female who presents for No chief complaint on file..  HPI  ***    ROS  Comprehensive review of systems is negative.    Objective   Physical Exam  There were no vitals taken for this visit.   There were no vitals filed for this visit.   There is no height or weight on file to calculate BMI.      ***    Current Medications[1]    Assessment/Plan   ***             [1]   Current Outpatient Medications   Medication Sig Dispense Refill    buPROPion XL (Wellbutrin XL) 150 mg 24 hr tablet Take 1 tablet (150 mg) by mouth once daily in the morning. Do not crush, chew, or split. 90 tablet 3    empagliflozin (Jardiance) 10 mg Take 1 tablet (10 mg) by mouth once daily. 90 tablet 3    fluocinolone (DermOtic) 0.01 % ear drops 4 drops to the affected ear/s twice daily as needed for itching. 1 bottle. 11 refills. 1 mL 11    levonorgestrel (Mirena) 21 mcg/24 hours (8 yrs) 52 mg IUD 52 mg by intrauterine route 1 time.      losartan (Cozaar) 25 mg tablet Take 1 tablet (25 mg) by mouth once daily. 100 tablet 3    omeprazole (PriLOSEC) 40 mg DR capsule TAKE 1 CAPSULE BY MOUTH EVERY DAY 30 MINUTES BEFORE MORNING MEAL 90 capsule 3    tirzepatide (Mounjaro) 5 mg/0.5 mL pen injector Inject 5 mg under the skin every 7 days. 2 mL 0    triamterene-hydrochlorothiazid (Maxzide-25) 37.5-25 mg tablet TAKE 1 TABLET BY MOUTH EVERY DAY IN THE MORNING FOR 90 DAYS 90 tablet 3     No current facility-administered medications for this visit.

## 2025-04-25 NOTE — TELEPHONE ENCOUNTER
Patient is calling to discuss her Mounjaro usage she has been using it for 4 weeks. Also, she needs a refill.

## 2025-05-30 ENCOUNTER — APPOINTMENT (OUTPATIENT)
Facility: CLINIC | Age: 50
End: 2025-05-30
Payer: COMMERCIAL

## 2025-08-18 ENCOUNTER — APPOINTMENT (OUTPATIENT)
Dept: ENDOCRINOLOGY | Facility: CLINIC | Age: 50
End: 2025-08-18
Payer: COMMERCIAL

## 2025-08-18 VITALS — BODY MASS INDEX: 48.24 KG/M2 | SYSTOLIC BLOOD PRESSURE: 122 MMHG | DIASTOLIC BLOOD PRESSURE: 74 MMHG | WEIGHT: 293 LBS

## 2025-08-18 DIAGNOSIS — E78.5 HYPERLIPIDEMIA, UNSPECIFIED HYPERLIPIDEMIA TYPE: ICD-10-CM

## 2025-08-18 DIAGNOSIS — E11.9 TYPE 2 DIABETES MELLITUS WITHOUT COMPLICATION, WITHOUT LONG-TERM CURRENT USE OF INSULIN: Primary | ICD-10-CM

## 2025-08-18 DIAGNOSIS — I10 HYPERTENSION, UNSPECIFIED TYPE: ICD-10-CM

## 2025-08-18 LAB — POC HEMOGLOBIN A1C: 6.5 % (ref 4.2–6.5)

## 2025-08-18 PROCEDURE — 4010F ACE/ARB THERAPY RXD/TAKEN: CPT | Performed by: INTERNAL MEDICINE

## 2025-08-18 PROCEDURE — 3074F SYST BP LT 130 MM HG: CPT | Performed by: INTERNAL MEDICINE

## 2025-08-18 PROCEDURE — 83036 HEMOGLOBIN GLYCOSYLATED A1C: CPT | Performed by: INTERNAL MEDICINE

## 2025-08-18 PROCEDURE — 99214 OFFICE O/P EST MOD 30 MIN: CPT | Performed by: INTERNAL MEDICINE

## 2025-08-18 PROCEDURE — 3078F DIAST BP <80 MM HG: CPT | Performed by: INTERNAL MEDICINE

## 2025-08-18 PROCEDURE — 3044F HG A1C LEVEL LT 7.0%: CPT | Performed by: INTERNAL MEDICINE

## 2025-12-30 ENCOUNTER — APPOINTMENT (OUTPATIENT)
Dept: ENDOCRINOLOGY | Facility: CLINIC | Age: 50
End: 2025-12-30
Payer: COMMERCIAL

## 2026-03-18 ENCOUNTER — APPOINTMENT (OUTPATIENT)
Dept: ENDOCRINOLOGY | Facility: CLINIC | Age: 51
End: 2026-03-18
Payer: COMMERCIAL